# Patient Record
Sex: FEMALE | Race: WHITE | NOT HISPANIC OR LATINO | Employment: OTHER | URBAN - METROPOLITAN AREA
[De-identification: names, ages, dates, MRNs, and addresses within clinical notes are randomized per-mention and may not be internally consistent; named-entity substitution may affect disease eponyms.]

---

## 2017-06-09 ENCOUNTER — TELEPHONE (OUTPATIENT)
Dept: TRANSPLANT | Facility: CLINIC | Age: 72
End: 2017-06-09

## 2017-06-09 DIAGNOSIS — I27.9 CHRONIC PULMONARY HEART DISEASE: ICD-10-CM

## 2017-06-09 DIAGNOSIS — R06.82 TACHYPNEA: ICD-10-CM

## 2017-06-09 DIAGNOSIS — Z79.899 POLYPHARMACY: Primary | ICD-10-CM

## 2017-06-09 NOTE — TELEPHONE ENCOUNTER
----- Message from Kathleen Mack MA sent at 6/9/2017  3:52 PM CDT -----  Contact: pt       ----- Message -----  From: Gilda Bravonereida  Sent: 6/9/2017   3:27 PM  To: [facility] Heart Transplant Schedulers    PT is a new patient sent from a Cardiologist.  She needs to see a doctor w/ pulmonary hypertension.  She is referred from Dr. Poli carmen/ [facility].  She can be reached @ [phone].  Thanks!!

## 2017-07-06 ENCOUNTER — TELEPHONE (OUTPATIENT)
Dept: TRANSPLANT | Facility: CLINIC | Age: 72
End: 2017-07-06

## 2017-12-27 ENCOUNTER — TELEPHONE (OUTPATIENT)
Dept: TRANSPLANT | Facility: CLINIC | Age: 72
End: 2017-12-27

## 2017-12-27 NOTE — TELEPHONE ENCOUNTER
Called referring physician's office and spoke with nurse. According to last MD note from 12/11, patient is following with [facility] Pulmon[facility]. Nurse unsure if patient is following at [facility] for PH.  Attempted to contact patient with number listed and no answer, with no voicemail.  Will close referral at this time.

## 2021-05-26 ENCOUNTER — OFFICE VISIT (OUTPATIENT)
Dept: OPHTHALMOLOGY | Facility: CLINIC | Age: 76
End: 2021-05-26
Payer: MEDICARE

## 2021-05-26 DIAGNOSIS — H40.051 OCULAR HYPERTENSION OF RIGHT EYE: ICD-10-CM

## 2021-05-26 DIAGNOSIS — H35.371 EPIRETINAL MEMBRANE (ERM) OF RIGHT EYE: ICD-10-CM

## 2021-05-26 DIAGNOSIS — H31.002 CHORIORETINAL SCAR, LEFT: Primary | ICD-10-CM

## 2021-05-26 PROCEDURE — 92201 OPSCPY EXTND RTA DRAW UNI/BI: CPT | Mod: PBBFAC | Performed by: OPHTHALMOLOGY

## 2021-05-26 PROCEDURE — 92201 PR OPHTHALMOSCOPY, EXT, W/RET DRAW/SCLERAL DEPR, I&R, UNI/BI: ICD-10-PCS | Mod: S$PBB,,, | Performed by: OPHTHALMOLOGY

## 2021-05-26 PROCEDURE — 99202 OFFICE O/P NEW SF 15 MIN: CPT | Mod: PBBFAC | Performed by: OPHTHALMOLOGY

## 2021-05-26 PROCEDURE — 99999 PR PBB SHADOW E&M-NEW PATIENT-LVL II: ICD-10-PCS | Mod: PBBFAC,,, | Performed by: OPHTHALMOLOGY

## 2021-05-26 PROCEDURE — 92201 OPSCPY EXTND RTA DRAW UNI/BI: CPT | Mod: S$PBB,,, | Performed by: OPHTHALMOLOGY

## 2021-05-26 PROCEDURE — 92004 PR EYE EXAM, NEW PATIENT,COMPREHESV: ICD-10-PCS | Mod: S$PBB,,, | Performed by: OPHTHALMOLOGY

## 2021-05-26 PROCEDURE — 92134 POSTERIOR SEGMENT OCT RETINA (OCULAR COHERENCE TOMOGRAPHY)-BOTH EYES: ICD-10-PCS | Mod: 26,S$PBB,, | Performed by: OPHTHALMOLOGY

## 2021-05-26 PROCEDURE — 92004 COMPRE OPH EXAM NEW PT 1/>: CPT | Mod: S$PBB,,, | Performed by: OPHTHALMOLOGY

## 2021-05-26 PROCEDURE — 99999 PR PBB SHADOW E&M-NEW PATIENT-LVL II: CPT | Mod: PBBFAC,,, | Performed by: OPHTHALMOLOGY

## 2021-05-26 PROCEDURE — 92134 CPTRZ OPH DX IMG PST SGM RTA: CPT | Mod: PBBFAC | Performed by: OPHTHALMOLOGY

## 2021-05-26 RX ORDER — IBUPROFEN 100 MG/5ML
1000 SUSPENSION, ORAL (FINAL DOSE FORM) ORAL
COMMUNITY
End: 2022-03-08

## 2021-05-26 RX ORDER — INSULIN ASPART 100 [IU]/ML
INJECTION, SOLUTION INTRAVENOUS; SUBCUTANEOUS
COMMUNITY

## 2021-05-26 RX ORDER — ALCOHOL 2.38 KG/3.79L
1 GEL TOPICAL DAILY
COMMUNITY

## 2021-05-26 RX ORDER — SPIRONOLACTONE 25 MG
TABLET ORAL
COMMUNITY

## 2021-05-26 RX ORDER — GLUCOSAM/CHONDRO/HERB 149/HYAL 750-100 MG
TABLET ORAL
COMMUNITY
End: 2023-04-03

## 2021-05-26 RX ORDER — METOPROLOL TARTRATE 50 MG/1
50 TABLET ORAL
COMMUNITY
Start: 2021-03-22 | End: 2022-01-04

## 2021-05-26 RX ORDER — AMLODIPINE BESYLATE 5 MG/1
5 TABLET ORAL 2 TIMES DAILY
COMMUNITY
Start: 2021-04-29 | End: 2023-12-06

## 2021-07-26 ENCOUNTER — OFFICE VISIT (OUTPATIENT)
Dept: OPHTHALMOLOGY | Facility: CLINIC | Age: 76
End: 2021-07-26
Payer: MEDICARE

## 2021-07-26 DIAGNOSIS — E08.3293 DIABETES MELLITUS DUE TO UNDERLYING CONDITION WITH BOTH EYES AFFECTED BY MILD NONPROLIFERATIVE RETINOPATHY WITHOUT MACULAR EDEMA, WITH LONG-TERM CURRENT USE OF INSULIN: ICD-10-CM

## 2021-07-26 DIAGNOSIS — H35.371 EPIRETINAL MEMBRANE, RIGHT EYE: ICD-10-CM

## 2021-07-26 DIAGNOSIS — Z79.4 DIABETES MELLITUS DUE TO UNDERLYING CONDITION WITH BOTH EYES AFFECTED BY MILD NONPROLIFERATIVE RETINOPATHY WITHOUT MACULAR EDEMA, WITH LONG-TERM CURRENT USE OF INSULIN: ICD-10-CM

## 2021-07-26 DIAGNOSIS — H40.1194 PRIMARY OPEN-ANGLE GLAUCOMA, INDETERMINATE STAGE, UNSPECIFIED LATERALITY: Primary | ICD-10-CM

## 2021-07-26 DIAGNOSIS — Z98.890 HX OF DETACHED RETINA REPAIR: ICD-10-CM

## 2021-07-26 DIAGNOSIS — Z86.69 HX OF DETACHED RETINA REPAIR: ICD-10-CM

## 2021-07-26 DIAGNOSIS — H40.053 OCULAR HYPERTENSION, BILATERAL: Primary | ICD-10-CM

## 2021-07-26 DIAGNOSIS — Z96.1 PSEUDOPHAKIA OF BOTH EYES: ICD-10-CM

## 2021-07-26 DIAGNOSIS — H40.023 OPEN ANGLE WITH BORDERLINE FINDINGS AND HIGH GLAUCOMA RISK IN BOTH EYES: ICD-10-CM

## 2021-07-26 PROCEDURE — 76514 ECHO EXAM OF EYE THICKNESS: CPT | Mod: 26,S$PBB,, | Performed by: OPHTHALMOLOGY

## 2021-07-26 PROCEDURE — 92250 COLOR FUNDUS PHOTOGRAPHY - OU - BOTH EYES: ICD-10-PCS | Mod: 26,S$PBB,, | Performed by: OPHTHALMOLOGY

## 2021-07-26 PROCEDURE — 92020 PR SPECIAL EYE EVAL,GONIOSCOPY: ICD-10-PCS | Mod: S$PBB,,, | Performed by: OPHTHALMOLOGY

## 2021-07-26 PROCEDURE — 76514 ECHO EXAM OF EYE THICKNESS: CPT | Mod: PBBFAC | Performed by: OPHTHALMOLOGY

## 2021-07-26 PROCEDURE — 92083 HUMPHREY VISUAL FIELD - OU - BOTH EYES: ICD-10-PCS | Mod: 26,S$PBB,, | Performed by: OPHTHALMOLOGY

## 2021-07-26 PROCEDURE — 92250 FUNDUS PHOTOGRAPHY W/I&R: CPT | Mod: PBBFAC | Performed by: OPHTHALMOLOGY

## 2021-07-26 PROCEDURE — 99213 OFFICE O/P EST LOW 20 MIN: CPT | Mod: PBBFAC | Performed by: OPHTHALMOLOGY

## 2021-07-26 PROCEDURE — 99999 PR PBB SHADOW E&M-EST. PATIENT-LVL III: CPT | Mod: PBBFAC,,, | Performed by: OPHTHALMOLOGY

## 2021-07-26 PROCEDURE — 92083 EXTENDED VISUAL FIELD XM: CPT | Mod: PBBFAC | Performed by: OPHTHALMOLOGY

## 2021-07-26 PROCEDURE — 76514 PR  US, EYE, FOR CORNEAL THICKNESS: ICD-10-PCS | Mod: 26,S$PBB,, | Performed by: OPHTHALMOLOGY

## 2021-07-26 PROCEDURE — 92020 GONIOSCOPY: CPT | Mod: PBBFAC | Performed by: OPHTHALMOLOGY

## 2021-07-26 PROCEDURE — 99999 PR PBB SHADOW E&M-EST. PATIENT-LVL III: ICD-10-PCS | Mod: PBBFAC,,, | Performed by: OPHTHALMOLOGY

## 2021-07-26 PROCEDURE — 99214 OFFICE O/P EST MOD 30 MIN: CPT | Mod: S$PBB,,, | Performed by: OPHTHALMOLOGY

## 2021-07-26 PROCEDURE — 99214 PR OFFICE/OUTPT VISIT, EST, LEVL IV, 30-39 MIN: ICD-10-PCS | Mod: S$PBB,,, | Performed by: OPHTHALMOLOGY

## 2021-07-26 PROCEDURE — 92020 GONIOSCOPY: CPT | Mod: S$PBB,,, | Performed by: OPHTHALMOLOGY

## 2021-07-26 RX ORDER — INSULIN LISPRO 100 [IU]/ML
INJECTION, SOLUTION INTRAVENOUS; SUBCUTANEOUS
COMMUNITY
Start: 2021-06-03

## 2021-07-26 RX ORDER — LATANOPROST 50 UG/ML
1 SOLUTION/ DROPS OPHTHALMIC NIGHTLY
COMMUNITY
End: 2021-07-26 | Stop reason: SDUPTHER

## 2021-07-26 RX ORDER — CHLORHEXIDINE GLUCONATE ORAL RINSE 1.2 MG/ML
SOLUTION DENTAL
COMMUNITY
Start: 2021-06-28 | End: 2023-05-09 | Stop reason: CLARIF

## 2021-07-26 RX ORDER — HYDROCODONE BITARTRATE AND ACETAMINOPHEN 7.5; 325 MG/1; MG/1
1 TABLET ORAL EVERY 4 HOURS PRN
COMMUNITY
Start: 2021-06-28 | End: 2023-12-06

## 2021-07-26 RX ORDER — LATANOPROST 50 UG/ML
1 SOLUTION/ DROPS OPHTHALMIC NIGHTLY
Qty: 2.5 ML | Refills: 6 | Status: SHIPPED | OUTPATIENT
Start: 2021-07-26 | End: 2021-11-22 | Stop reason: SDUPTHER

## 2021-09-15 ENCOUNTER — TELEPHONE (OUTPATIENT)
Dept: OPHTHALMOLOGY | Facility: CLINIC | Age: 76
End: 2021-09-15

## 2021-10-05 ENCOUNTER — OFFICE VISIT (OUTPATIENT)
Dept: OPHTHALMOLOGY | Facility: CLINIC | Age: 76
End: 2021-10-05
Payer: MEDICARE

## 2021-10-05 DIAGNOSIS — Z96.1 PSEUDOPHAKIA OF BOTH EYES: ICD-10-CM

## 2021-10-05 DIAGNOSIS — Z86.69 HX OF DETACHED RETINA REPAIR: ICD-10-CM

## 2021-10-05 DIAGNOSIS — H40.023 OPEN ANGLE WITH BORDERLINE FINDINGS AND HIGH GLAUCOMA RISK IN BOTH EYES: Primary | ICD-10-CM

## 2021-10-05 DIAGNOSIS — H40.053 OCULAR HYPERTENSION, BILATERAL: ICD-10-CM

## 2021-10-05 DIAGNOSIS — H35.371 EPIRETINAL MEMBRANE, RIGHT EYE: ICD-10-CM

## 2021-10-05 DIAGNOSIS — Z98.890 HX OF DETACHED RETINA REPAIR: ICD-10-CM

## 2021-10-05 PROCEDURE — 92133 CPTRZD OPH DX IMG PST SGM ON: CPT | Mod: PBBFAC | Performed by: OPHTHALMOLOGY

## 2021-10-05 PROCEDURE — 99214 OFFICE O/P EST MOD 30 MIN: CPT | Mod: S$PBB,,, | Performed by: OPHTHALMOLOGY

## 2021-10-05 PROCEDURE — 99999 PR PBB SHADOW E&M-EST. PATIENT-LVL III: ICD-10-PCS | Mod: PBBFAC,,, | Performed by: OPHTHALMOLOGY

## 2021-10-05 PROCEDURE — 92133 POSTERIOR SEGMENT OCT OPTIC NERVE(OCULAR COHERENCE TOMOGRAPHY) - OU - BOTH EYES: ICD-10-PCS | Mod: 26,S$PBB,, | Performed by: OPHTHALMOLOGY

## 2021-10-05 PROCEDURE — 99214 PR OFFICE/OUTPT VISIT, EST, LEVL IV, 30-39 MIN: ICD-10-PCS | Mod: S$PBB,,, | Performed by: OPHTHALMOLOGY

## 2021-10-05 PROCEDURE — 99999 PR PBB SHADOW E&M-EST. PATIENT-LVL III: CPT | Mod: PBBFAC,,, | Performed by: OPHTHALMOLOGY

## 2021-10-05 PROCEDURE — 99213 OFFICE O/P EST LOW 20 MIN: CPT | Mod: PBBFAC | Performed by: OPHTHALMOLOGY

## 2021-10-05 RX ORDER — IBANDRONATE SODIUM 150 MG/1
TABLET, FILM COATED ORAL
COMMUNITY
End: 2023-05-09 | Stop reason: CLARIF

## 2021-10-05 RX ORDER — CLONIDINE HYDROCHLORIDE 0.1 MG/1
0.1 TABLET ORAL 3 TIMES DAILY
COMMUNITY
Start: 2021-07-27 | End: 2023-05-09 | Stop reason: CLARIF

## 2021-10-05 RX ORDER — AMLODIPINE AND VALSARTAN 5; 160 MG/1; MG/1
TABLET ORAL
COMMUNITY
End: 2023-05-09 | Stop reason: CLARIF

## 2021-10-05 RX ORDER — NAPROXEN SODIUM 220 MG/1
TABLET, FILM COATED ORAL
COMMUNITY
End: 2022-01-04

## 2021-11-22 DIAGNOSIS — H40.1194 PRIMARY OPEN-ANGLE GLAUCOMA, INDETERMINATE STAGE, UNSPECIFIED LATERALITY: ICD-10-CM

## 2021-11-22 RX ORDER — LATANOPROST 50 UG/ML
1 SOLUTION/ DROPS OPHTHALMIC NIGHTLY
Qty: 7.5 ML | Refills: 4 | Status: SHIPPED | OUTPATIENT
Start: 2021-11-22 | End: 2022-01-27 | Stop reason: SDUPTHER

## 2021-12-28 ENCOUNTER — TELEPHONE (OUTPATIENT)
Dept: OPHTHALMOLOGY | Facility: CLINIC | Age: 76
End: 2021-12-28
Payer: MEDICARE

## 2021-12-29 ENCOUNTER — OFFICE VISIT (OUTPATIENT)
Dept: OPHTHALMOLOGY | Facility: CLINIC | Age: 76
End: 2021-12-29
Payer: MEDICARE

## 2021-12-29 DIAGNOSIS — Z86.69 HX OF DETACHED RETINA REPAIR: ICD-10-CM

## 2021-12-29 DIAGNOSIS — H35.371 EPIRETINAL MEMBRANE, RIGHT EYE: ICD-10-CM

## 2021-12-29 DIAGNOSIS — E08.3293 DIABETES MELLITUS DUE TO UNDERLYING CONDITION WITH BOTH EYES AFFECTED BY MILD NONPROLIFERATIVE RETINOPATHY WITHOUT MACULAR EDEMA, WITH LONG-TERM CURRENT USE OF INSULIN: ICD-10-CM

## 2021-12-29 DIAGNOSIS — Z98.890 HX OF DETACHED RETINA REPAIR: ICD-10-CM

## 2021-12-29 DIAGNOSIS — Z79.4 DIABETES MELLITUS DUE TO UNDERLYING CONDITION WITH BOTH EYES AFFECTED BY MILD NONPROLIFERATIVE RETINOPATHY WITHOUT MACULAR EDEMA, WITH LONG-TERM CURRENT USE OF INSULIN: ICD-10-CM

## 2021-12-29 DIAGNOSIS — H40.51X2 NEOVASCULAR GLAUCOMA OF RIGHT EYE, MODERATE STAGE: Primary | ICD-10-CM

## 2021-12-29 PROCEDURE — 92285 EXTERNAL OCULAR PHOTOGRAPHY: CPT | Mod: PBBFAC | Performed by: OPHTHALMOLOGY

## 2021-12-29 PROCEDURE — 99999 PR PBB SHADOW E&M-EST. PATIENT-LVL III: ICD-10-PCS | Mod: PBBFAC,,, | Performed by: OPHTHALMOLOGY

## 2021-12-29 PROCEDURE — 99214 OFFICE O/P EST MOD 30 MIN: CPT | Mod: S$PBB,,, | Performed by: OPHTHALMOLOGY

## 2021-12-29 PROCEDURE — 99999 PR PBB SHADOW E&M-EST. PATIENT-LVL III: CPT | Mod: PBBFAC,,, | Performed by: OPHTHALMOLOGY

## 2021-12-29 PROCEDURE — 99213 OFFICE O/P EST LOW 20 MIN: CPT | Mod: PBBFAC,25 | Performed by: OPHTHALMOLOGY

## 2021-12-29 PROCEDURE — 99214 PR OFFICE/OUTPT VISIT, EST, LEVL IV, 30-39 MIN: ICD-10-PCS | Mod: S$PBB,,, | Performed by: OPHTHALMOLOGY

## 2021-12-29 PROCEDURE — 92020 GONIOSCOPY: CPT | Mod: S$PBB,,, | Performed by: OPHTHALMOLOGY

## 2021-12-29 PROCEDURE — 92235 FLUORESCEIN ANGIOGRAPHY - OU - BOTH EYES: ICD-10-PCS | Mod: 26,S$PBB,, | Performed by: OPHTHALMOLOGY

## 2021-12-29 PROCEDURE — 92235 FLUORESCEIN ANGRPH MLTIFRAME: CPT | Mod: 50,PBBFAC | Performed by: OPHTHALMOLOGY

## 2021-12-29 PROCEDURE — 92020 PR SPECIAL EYE EVAL,GONIOSCOPY: ICD-10-PCS | Mod: S$PBB,,, | Performed by: OPHTHALMOLOGY

## 2021-12-29 PROCEDURE — 92020 GONIOSCOPY: CPT | Mod: PBBFAC | Performed by: OPHTHALMOLOGY

## 2021-12-29 RX ORDER — FLUORESCEIN 500 MG/ML
5 INJECTION INTRAVENOUS
Status: COMPLETED | OUTPATIENT
Start: 2021-12-29 | End: 2021-12-29

## 2021-12-29 RX ADMIN — FLUORESCEIN 500 MG: 500 INJECTION INTRAVENOUS at 03:12

## 2021-12-29 RX ADMIN — BEVACIZUMAB 1.25 MG: 100 INJECTION, SOLUTION INTRAVENOUS at 04:12

## 2022-01-04 ENCOUNTER — OFFICE VISIT (OUTPATIENT)
Dept: OPHTHALMOLOGY | Facility: CLINIC | Age: 77
End: 2022-01-04
Payer: MEDICARE

## 2022-01-04 DIAGNOSIS — H40.51X2 NEOVASCULAR GLAUCOMA, RIGHT, MODERATE STAGE: ICD-10-CM

## 2022-01-04 DIAGNOSIS — H35.371 EPIRETINAL MEMBRANE, RIGHT EYE: Primary | ICD-10-CM

## 2022-01-04 DIAGNOSIS — H35.82 OCULAR ISCHEMIC SYNDROME: ICD-10-CM

## 2022-01-04 PROCEDURE — 99999 PR PBB SHADOW E&M-EST. PATIENT-LVL II: ICD-10-PCS | Mod: PBBFAC,,, | Performed by: OPHTHALMOLOGY

## 2022-01-04 PROCEDURE — 99212 OFFICE O/P EST SF 10 MIN: CPT | Mod: PBBFAC | Performed by: OPHTHALMOLOGY

## 2022-01-04 PROCEDURE — 99999 PR PBB SHADOW E&M-EST. PATIENT-LVL II: CPT | Mod: PBBFAC,,, | Performed by: OPHTHALMOLOGY

## 2022-01-04 PROCEDURE — 67228 TREATMENT X10SV RETINOPATHY: CPT | Mod: S$PBB,RT,, | Performed by: OPHTHALMOLOGY

## 2022-01-04 PROCEDURE — 67228 TREATMENT X10SV RETINOPATHY: CPT | Mod: PBBFAC,RT | Performed by: OPHTHALMOLOGY

## 2022-01-04 PROCEDURE — 92014 PR EYE EXAM, EST PATIENT,COMPREHESV: ICD-10-PCS | Mod: 57,S$PBB,, | Performed by: OPHTHALMOLOGY

## 2022-01-04 PROCEDURE — 67228 PR TREATMENT EXTENSIVE RETINOPATHY, PHOTOCOAGULATION: ICD-10-PCS | Mod: S$PBB,RT,, | Performed by: OPHTHALMOLOGY

## 2022-01-04 PROCEDURE — 92014 COMPRE OPH EXAM EST PT 1/>: CPT | Mod: 57,S$PBB,, | Performed by: OPHTHALMOLOGY

## 2022-01-04 RX ORDER — IBUPROFEN 100 MG/5ML
1000 SUSPENSION, ORAL (FINAL DOSE FORM) ORAL DAILY
COMMUNITY

## 2022-01-04 RX ORDER — LUTEIN 6 MG
6 TABLET ORAL DAILY
COMMUNITY
End: 2022-03-08

## 2022-01-04 NOTE — PROGRESS NOTES
HPI      DLS 12/29/2021 by Dr. AGUSTIN Prater MD           05/26/2021 by Dr. Angie MD    77 YO F w/ h/o A-fib and Tachycardia--reports her last episode occurred in   March.        Pt reports vision is blurry OD (appearance of a FOG) w/o flashes/and   floaters.     CC: pt is here  today for possible PRP Laser due to elevated IOP of 43 w/   Dr. Prater for the TX of NVO OD    -pain  ++blurred Va ( cloud in vision )   -flashes/floaters  ++fog over OD )onset x 3 wks ago)    Eye meds: Latanaprost OD qhs                    Alphagan OD bid                    Atropine OD qhs    A/P    1. 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 Altamirano    IOP improved today    Start PRP OD today  Continue gtts    2. RD OS  S/p buckle/cryo 1980 with Dr. Payton    3. PCIOL OU      BS/BP/chol control      2 weeks complete PRP OD       Risks, benefits, and alternatives to treatment discussed in detail with the patient.  The patient voiced understanding and wished to proceed with the procedure    Laser Procedure Note  Dx: NVG from OIS OD  Laser: PRP OD  Argon  Spot: 200  Power: 150-300  Dur: 0.1  #:   [id number]  Complications: None  F/U as above

## 2022-01-27 ENCOUNTER — OFFICE VISIT (OUTPATIENT)
Dept: OPHTHALMOLOGY | Facility: CLINIC | Age: 77
End: 2022-01-27
Payer: MEDICARE

## 2022-01-27 DIAGNOSIS — H40.51X2 NEOVASCULAR GLAUCOMA, RIGHT, MODERATE STAGE: Primary | ICD-10-CM

## 2022-01-27 DIAGNOSIS — H40.1194 PRIMARY OPEN-ANGLE GLAUCOMA, INDETERMINATE STAGE, UNSPECIFIED LATERALITY: ICD-10-CM

## 2022-01-27 DIAGNOSIS — H35.82 OCULAR ISCHEMIC SYNDROME: ICD-10-CM

## 2022-01-27 PROCEDURE — 99999 PR PBB SHADOW E&M-EST. PATIENT-LVL III: CPT | Mod: PBBFAC,,, | Performed by: OPHTHALMOLOGY

## 2022-01-27 PROCEDURE — 99213 OFFICE O/P EST LOW 20 MIN: CPT | Mod: PBBFAC | Performed by: OPHTHALMOLOGY

## 2022-01-27 PROCEDURE — 99499 NO LOS: ICD-10-PCS | Mod: S$PBB,,, | Performed by: OPHTHALMOLOGY

## 2022-01-27 PROCEDURE — 67228 TREATMENT X10SV RETINOPATHY: CPT | Mod: S$PBB,RT,, | Performed by: OPHTHALMOLOGY

## 2022-01-27 PROCEDURE — 67228 PR TREATMENT EXTENSIVE RETINOPATHY, PHOTOCOAGULATION: ICD-10-PCS | Mod: S$PBB,RT,, | Performed by: OPHTHALMOLOGY

## 2022-01-27 PROCEDURE — 67228 TREATMENT X10SV RETINOPATHY: CPT | Mod: PBBFAC,RT | Performed by: OPHTHALMOLOGY

## 2022-01-27 PROCEDURE — 99999 PR PBB SHADOW E&M-EST. PATIENT-LVL III: ICD-10-PCS | Mod: PBBFAC,,, | Performed by: OPHTHALMOLOGY

## 2022-01-27 PROCEDURE — 99499 UNLISTED E&M SERVICE: CPT | Mod: S$PBB,,, | Performed by: OPHTHALMOLOGY

## 2022-01-27 RX ORDER — BRIMONIDINE TARTRATE 1.5 MG/ML
1 SOLUTION/ DROPS OPHTHALMIC 2 TIMES DAILY
COMMUNITY
End: 2022-01-27 | Stop reason: SDUPTHER

## 2022-01-27 NOTE — PROGRESS NOTES
HPI      DLS 12/29/2021 by Dr. AGUSTIN Prater MD           05/26/2021 by Dr. CHEY Rico MD    Pt here for PRP OD.  Pt states she feels some slight improvement VA OD.    Pt denies eye pain, flashes or floaters.     Latanaprost OD qhs  Alphagan OD bid  POHx:   Ocular hypertension, bilateral          A/P    1. 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 Altamirano    IOP improved today    Complete PRP OD today  Continue gtts    2. RD OS  S/p buckle/cryo 1980 with Dr. Payton    3. PCIOL OU      BS/BP/chol control      4 weeks oct and dilate       Risks, benefits, and alternatives to treatment discussed in detail with the patient.  The patient voiced understanding and wished to proceed with the procedure    Laser Procedure Note  Dx: NVG from OIS OD  Laser: PRP OD  Argon  Spot: 200  Power: 150-300  Dur: 0.1  #:   [id number]  Complications: None  F/U as above

## 2022-01-28 RX ORDER — BRIMONIDINE TARTRATE 1.5 MG/ML
1 SOLUTION/ DROPS OPHTHALMIC 2 TIMES DAILY
Qty: 10 ML | Refills: 4 | Status: SHIPPED | OUTPATIENT
Start: 2022-01-28 | End: 2022-03-08

## 2022-01-28 RX ORDER — LATANOPROST 50 UG/ML
1 SOLUTION/ DROPS OPHTHALMIC NIGHTLY
Qty: 7.5 ML | Refills: 4 | Status: SHIPPED | OUTPATIENT
Start: 2022-01-28

## 2022-02-07 ENCOUNTER — OFFICE VISIT (OUTPATIENT)
Dept: OPHTHALMOLOGY | Facility: CLINIC | Age: 77
End: 2022-02-07
Payer: MEDICARE

## 2022-02-07 DIAGNOSIS — H40.51X2 NEOVASCULAR GLAUCOMA, RIGHT, MODERATE STAGE: Primary | ICD-10-CM

## 2022-02-07 DIAGNOSIS — Z86.69 HX OF DETACHED RETINA REPAIR: ICD-10-CM

## 2022-02-07 DIAGNOSIS — H40.053 OCULAR HYPERTENSION, BILATERAL: ICD-10-CM

## 2022-02-07 DIAGNOSIS — Z96.1 PSEUDOPHAKIA OF BOTH EYES: ICD-10-CM

## 2022-02-07 DIAGNOSIS — Z98.890 HX OF DETACHED RETINA REPAIR: ICD-10-CM

## 2022-02-07 DIAGNOSIS — H35.82 OCULAR ISCHEMIC SYNDROME: ICD-10-CM

## 2022-02-07 PROCEDURE — 92020 GONIOSCOPY: CPT | Mod: PBBFAC | Performed by: OPHTHALMOLOGY

## 2022-02-07 PROCEDURE — 99999 PR PBB SHADOW E&M-EST. PATIENT-LVL III: CPT | Mod: PBBFAC,,, | Performed by: OPHTHALMOLOGY

## 2022-02-07 PROCEDURE — 99214 OFFICE O/P EST MOD 30 MIN: CPT | Mod: S$PBB,,, | Performed by: OPHTHALMOLOGY

## 2022-02-07 PROCEDURE — 99999 PR PBB SHADOW E&M-EST. PATIENT-LVL III: ICD-10-PCS | Mod: PBBFAC,,, | Performed by: OPHTHALMOLOGY

## 2022-02-07 PROCEDURE — 99214 PR OFFICE/OUTPT VISIT, EST, LEVL IV, 30-39 MIN: ICD-10-PCS | Mod: S$PBB,,, | Performed by: OPHTHALMOLOGY

## 2022-02-07 PROCEDURE — 92020 PR SPECIAL EYE EVAL,GONIOSCOPY: ICD-10-PCS | Mod: S$PBB,,, | Performed by: OPHTHALMOLOGY

## 2022-02-07 PROCEDURE — 92020 GONIOSCOPY: CPT | Mod: S$PBB,,, | Performed by: OPHTHALMOLOGY

## 2022-02-07 PROCEDURE — 99213 OFFICE O/P EST LOW 20 MIN: CPT | Mod: PBBFAC | Performed by: OPHTHALMOLOGY

## 2022-02-07 RX ORDER — DORZOLAMIDE HCL 20 MG/ML
1 SOLUTION/ DROPS OPHTHALMIC 2 TIMES DAILY
COMMUNITY
End: 2022-02-07 | Stop reason: SDUPTHER

## 2022-02-07 RX ORDER — BRIMONIDINE TARTRATE 2 MG/ML
1 SOLUTION/ DROPS OPHTHALMIC 2 TIMES DAILY
COMMUNITY
End: 2022-02-07 | Stop reason: SDUPTHER

## 2022-02-07 RX ORDER — DORZOLAMIDE HCL 20 MG/ML
1 SOLUTION/ DROPS OPHTHALMIC 2 TIMES DAILY
Qty: 10 ML | Refills: 6 | Status: SHIPPED | OUTPATIENT
Start: 2022-02-07 | End: 2023-01-09 | Stop reason: SDUPTHER

## 2022-02-07 RX ORDER — BRIMONIDINE TARTRATE 2 MG/ML
1 SOLUTION/ DROPS OPHTHALMIC 2 TIMES DAILY
Qty: 10 ML | Refills: 6 | Status: SHIPPED | OUTPATIENT
Start: 2022-02-07 | End: 2023-01-09 | Stop reason: SDUPTHER

## 2022-02-07 NOTE — PROGRESS NOTES
HPI     DLS: 01/27/2022-Raulzulla   12/29/2021-Moi  Ocular hypertension, bilateral   Diabetes mellitus due to underlying condition with both eyes a ffected by   mild nonproliferative retinopathy without macular edema, with long-term   current use of insulin   Pseudophakia of both eyes   Hx of detached retina repair   Epiretinal membrane, right eye   S/p ERM peel in 2013 OD.   PRP OD 01/27/2022    Open angle with borderline find ings and high glaucoma risk in both eyes     Xalatan QHS OU  Brimonidine BID OU       Last edited by Di Soto MA on 2/7/2022  1:25 PM. (History)            Assessment /Plan     For exam results, see Encounter Report.    Neovascular glaucoma, right, moderate stage    Hx of detached retina repair    Pseudophakia of both eyes    Ocular ischemic syndrome        A-Fib    Presents 12/29/2021 --> 43 // 18  + NVG // + NVA OD  + Iritis   + IVFA delayed fill with NVE  ==> PDR vs OIS OD  ==> discussed with patient concerns & options  ==> Proceed with IO IRIS & tap OD  ==> Sees outside cardiology for A-Fib --> recommend Carotid US as discussed      POAG High Righ risk OD > OS  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    Pre-Tx 34 // 20  07/26/2021    CCT  543 // 565    <24 --> Not achieved --> add Dorz --> discussed Xen // Ahmed options    Right eye --> adjust  Xal q day  Alphagan BID  Dorz BID --> start and discussed SE, use and expectations with Q & A  PF 1% BID --> off  A 1% q day --> off  On BB --> Does not tolerate well      H/o Pseudophakia OU  OD with Dr. Castle in 2007   OS with Dr. Huynh in 2014 --> Sulcus 3 piece  Open Cap OU      H/o RD OS  SP  SB/repair in 1980 --> Dr Payton    SP ERM Peel // PPVx OD      NIDDM   Right eye + PDR c NVG  12/29/2021  - poorly controlled A1c for many years. ~9  Very difficult control 2/2 to gastroparesis  Diagnosed at age 36  + IO IRIS c AC Tap per retina service    RD precautions:  Discussed symptoms of RD with increased flashes, floaters, decreasing vision.   Patient/Family to call and return immediately to clinic should the symptoms of RD occur. Voiced good understanding with Q & A.      Right eye NVA  12/29/2021        Plan:  RTC 1 month IOP & adherence --> Retina service  RTC sooner prn with good understanding

## 2022-03-08 ENCOUNTER — OFFICE VISIT (OUTPATIENT)
Dept: OPHTHALMOLOGY | Facility: CLINIC | Age: 77
End: 2022-03-08
Payer: MEDICARE

## 2022-03-08 DIAGNOSIS — H40.51X2 NEOVASCULAR GLAUCOMA, RIGHT, MODERATE STAGE: Primary | ICD-10-CM

## 2022-03-08 DIAGNOSIS — H35.371 EPIRETINAL MEMBRANE, RIGHT EYE: ICD-10-CM

## 2022-03-08 DIAGNOSIS — H35.82 OCULAR ISCHEMIC SYNDROME: ICD-10-CM

## 2022-03-08 DIAGNOSIS — H33.022 RETINAL DETACHMENT OF LEFT EYE WITH MULTIPLE BREAKS: ICD-10-CM

## 2022-03-08 PROCEDURE — 92201 OPSCPY EXTND RTA DRAW UNI/BI: CPT | Mod: PBBFAC | Performed by: OPHTHALMOLOGY

## 2022-03-08 PROCEDURE — 99213 OFFICE O/P EST LOW 20 MIN: CPT | Mod: PBBFAC | Performed by: OPHTHALMOLOGY

## 2022-03-08 PROCEDURE — 92014 PR EYE EXAM, EST PATIENT,COMPREHESV: ICD-10-PCS | Mod: S$PBB,,, | Performed by: OPHTHALMOLOGY

## 2022-03-08 PROCEDURE — 92201 PR OPHTHALMOSCOPY, EXT, W/RET DRAW/SCLERAL DEPR, I&R, UNI/BI: ICD-10-PCS | Mod: S$PBB,,, | Performed by: OPHTHALMOLOGY

## 2022-03-08 PROCEDURE — 99999 PR PBB SHADOW E&M-EST. PATIENT-LVL III: ICD-10-PCS | Mod: PBBFAC,,, | Performed by: OPHTHALMOLOGY

## 2022-03-08 PROCEDURE — 92134 POSTERIOR SEGMENT OCT RETINA (OCULAR COHERENCE TOMOGRAPHY)-BOTH EYES: ICD-10-PCS | Mod: 26,S$PBB,, | Performed by: OPHTHALMOLOGY

## 2022-03-08 PROCEDURE — 92201 OPSCPY EXTND RTA DRAW UNI/BI: CPT | Mod: S$PBB,,, | Performed by: OPHTHALMOLOGY

## 2022-03-08 PROCEDURE — 92134 CPTRZ OPH DX IMG PST SGM RTA: CPT | Mod: PBBFAC | Performed by: OPHTHALMOLOGY

## 2022-03-08 PROCEDURE — 99999 PR PBB SHADOW E&M-EST. PATIENT-LVL III: CPT | Mod: PBBFAC,,, | Performed by: OPHTHALMOLOGY

## 2022-03-08 PROCEDURE — 92014 COMPRE OPH EXAM EST PT 1/>: CPT | Mod: S$PBB,,, | Performed by: OPHTHALMOLOGY

## 2022-03-08 RX ORDER — FAMOTIDINE 20 MG/1
20 TABLET, FILM COATED ORAL DAILY
COMMUNITY
Start: 2022-01-11 | End: 2023-12-06

## 2022-03-08 RX ORDER — BLOOD SUGAR DIAGNOSTIC
STRIP MISCELLANEOUS 3 TIMES DAILY
COMMUNITY
Start: 2022-02-03 | End: 2023-12-06

## 2022-03-08 RX ORDER — HYDROGEN PEROXIDE 3 %
20 SOLUTION, NON-ORAL MISCELLANEOUS
COMMUNITY
End: 2023-05-09 | Stop reason: CLARIF

## 2022-03-08 RX ORDER — DORZOLAMIDE HYDROCHLORIDE AND TIMOLOL MALEATE 20; 5 MG/ML; MG/ML
1 SOLUTION/ DROPS OPHTHALMIC
COMMUNITY
End: 2023-12-06

## 2022-03-08 NOTE — PROGRESS NOTES
"HPI     4 wk fu OCT/DFE post PRP OD    Pt unsure of how her va is today. Has improved "foggy" va but fluctuates   from day to day per pt. Pt states her eye feel tired. Pt has not had any   headaches, pain OD since last visit.    No flashes  No floater  No pain today  Gtts: Alphagan BID OD          Trusopt BID OD           Latanoprost QHS OD    Last edited by Juliann Leyva on 3/8/2022 10:47 AM. (History)        OCT - small subfoveal fluid collection  OS no ME       A/P    1. 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 Altamirano  S/p PRP OD    IOP improved today    3/22 small subfoveal fluid collection post PRP - ASx  Monitor for improvement, can consider Avastin if NI next visit    2. RD OS  S/p buckle/cryo 1980 with Dr. Payton    3. PCIOL OU    4. ERM OD  S/p PPV with Amina      BS/BP/chol control      8 weeks oct no dilate     "

## 2022-04-06 ENCOUNTER — TELEPHONE (OUTPATIENT)
Dept: OPHTHALMOLOGY | Facility: CLINIC | Age: 77
End: 2022-04-06
Payer: MEDICARE

## 2022-04-06 NOTE — TELEPHONE ENCOUNTER
----- Message from Kenzie Biswas sent at 4/6/2022 11:41 AM CDT -----  Regarding: Appt  Contact: Jayda Werner is calling to see if need to be seen again.    [phone]

## 2022-05-10 ENCOUNTER — OFFICE VISIT (OUTPATIENT)
Dept: OPHTHALMOLOGY | Facility: CLINIC | Age: 77
End: 2022-05-10
Payer: MEDICARE

## 2022-05-10 DIAGNOSIS — H40.053 OCULAR HYPERTENSION, BILATERAL: ICD-10-CM

## 2022-05-10 DIAGNOSIS — H35.371 EPIRETINAL MEMBRANE, RIGHT EYE: ICD-10-CM

## 2022-05-10 DIAGNOSIS — H33.022 RETINAL DETACHMENT OF LEFT EYE WITH MULTIPLE BREAKS: Primary | ICD-10-CM

## 2022-05-10 DIAGNOSIS — H40.51X2 NEOVASCULAR GLAUCOMA, RIGHT, MODERATE STAGE: ICD-10-CM

## 2022-05-10 DIAGNOSIS — H35.82 OCULAR ISCHEMIC SYNDROME: ICD-10-CM

## 2022-05-10 PROCEDURE — 92014 PR EYE EXAM, EST PATIENT,COMPREHESV: ICD-10-PCS | Mod: S$PBB,,, | Performed by: OPHTHALMOLOGY

## 2022-05-10 PROCEDURE — 99999 PR PBB SHADOW E&M-EST. PATIENT-LVL IV: CPT | Mod: PBBFAC,,, | Performed by: OPHTHALMOLOGY

## 2022-05-10 PROCEDURE — 92201 PR OPHTHALMOSCOPY, EXT, W/RET DRAW/SCLERAL DEPR, I&R, UNI/BI: ICD-10-PCS | Mod: S$PBB,,, | Performed by: OPHTHALMOLOGY

## 2022-05-10 PROCEDURE — 92014 COMPRE OPH EXAM EST PT 1/>: CPT | Mod: S$PBB,,, | Performed by: OPHTHALMOLOGY

## 2022-05-10 PROCEDURE — 92201 OPSCPY EXTND RTA DRAW UNI/BI: CPT | Mod: PBBFAC | Performed by: OPHTHALMOLOGY

## 2022-05-10 PROCEDURE — 99214 OFFICE O/P EST MOD 30 MIN: CPT | Mod: PBBFAC | Performed by: OPHTHALMOLOGY

## 2022-05-10 PROCEDURE — 99999 PR PBB SHADOW E&M-EST. PATIENT-LVL IV: ICD-10-PCS | Mod: PBBFAC,,, | Performed by: OPHTHALMOLOGY

## 2022-05-10 PROCEDURE — 92134 POSTERIOR SEGMENT OCT RETINA (OCULAR COHERENCE TOMOGRAPHY)-BOTH EYES: ICD-10-PCS | Mod: 26,S$PBB,, | Performed by: OPHTHALMOLOGY

## 2022-05-10 PROCEDURE — 92134 CPTRZ OPH DX IMG PST SGM RTA: CPT | Mod: PBBFAC | Performed by: OPHTHALMOLOGY

## 2022-05-10 PROCEDURE — 92201 OPSCPY EXTND RTA DRAW UNI/BI: CPT | Mod: S$PBB,,, | Performed by: OPHTHALMOLOGY

## 2022-05-10 NOTE — PROGRESS NOTES
HPI     Patient here today for 8 week f/u. C/o blurry VA and tired eyes. No pain.   No f/f.    Brimonidine bid OD  Dorzolamide bid OD  Latanoprost qhs OD    Last edited by Brinda Frausto on 5/10/2022 11:09 AM. (History)               OCT - small subfoveal fluid collection - improving  OS no ME       A/P    1. 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 Altamirano  S/p PRP OD    IOP improved today    3/22 small subfoveal fluid collection post PRP - ASx  5/22 - fluid improving without tx  obs today    2. RD OS  S/p buckle/cryo 1980 with Dr. Payton    3. PCIOL OU    4. ERM OD  S/p PPV with Huynh      BS/BP/chol control      4 months OCT and dilate

## 2022-09-13 ENCOUNTER — OFFICE VISIT (OUTPATIENT)
Dept: OPHTHALMOLOGY | Facility: CLINIC | Age: 77
End: 2022-09-13
Payer: MEDICARE

## 2022-09-13 DIAGNOSIS — H40.51X2 NEOVASCULAR GLAUCOMA, RIGHT, MODERATE STAGE: ICD-10-CM

## 2022-09-13 DIAGNOSIS — H33.022 RETINAL DETACHMENT OF LEFT EYE WITH MULTIPLE BREAKS: Primary | ICD-10-CM

## 2022-09-13 DIAGNOSIS — H26.492 POSTERIOR CAPSULAR OPACIFICATION, LEFT: ICD-10-CM

## 2022-09-13 DIAGNOSIS — H35.371 EPIRETINAL MEMBRANE, RIGHT EYE: ICD-10-CM

## 2022-09-13 DIAGNOSIS — H35.82 OCULAR ISCHEMIC SYNDROME: ICD-10-CM

## 2022-09-13 PROCEDURE — 92201 PR OPHTHALMOSCOPY, EXT, W/RET DRAW/SCLERAL DEPR, I&R, UNI/BI: ICD-10-PCS | Mod: S$PBB,,, | Performed by: OPHTHALMOLOGY

## 2022-09-13 PROCEDURE — 92014 COMPRE OPH EXAM EST PT 1/>: CPT | Mod: S$PBB,,, | Performed by: OPHTHALMOLOGY

## 2022-09-13 PROCEDURE — 99213 OFFICE O/P EST LOW 20 MIN: CPT | Mod: PBBFAC | Performed by: OPHTHALMOLOGY

## 2022-09-13 PROCEDURE — 92134 POSTERIOR SEGMENT OCT RETINA (OCULAR COHERENCE TOMOGRAPHY)-BOTH EYES: ICD-10-PCS | Mod: 26,S$PBB,, | Performed by: OPHTHALMOLOGY

## 2022-09-13 PROCEDURE — 92014 PR EYE EXAM, EST PATIENT,COMPREHESV: ICD-10-PCS | Mod: S$PBB,,, | Performed by: OPHTHALMOLOGY

## 2022-09-13 PROCEDURE — 99999 PR PBB SHADOW E&M-EST. PATIENT-LVL III: CPT | Mod: PBBFAC,,, | Performed by: OPHTHALMOLOGY

## 2022-09-13 PROCEDURE — 92201 OPSCPY EXTND RTA DRAW UNI/BI: CPT | Mod: S$PBB,,, | Performed by: OPHTHALMOLOGY

## 2022-09-13 PROCEDURE — 92201 OPSCPY EXTND RTA DRAW UNI/BI: CPT | Mod: PBBFAC | Performed by: OPHTHALMOLOGY

## 2022-09-13 PROCEDURE — 92134 CPTRZ OPH DX IMG PST SGM RTA: CPT | Mod: PBBFAC | Performed by: OPHTHALMOLOGY

## 2022-09-13 PROCEDURE — 99999 PR PBB SHADOW E&M-EST. PATIENT-LVL III: ICD-10-PCS | Mod: PBBFAC,,, | Performed by: OPHTHALMOLOGY

## 2022-09-13 NOTE — PROGRESS NOTES
HPI    Patient here today for 4 mo f/u. C/o worsening VA ou, no pain or f/f.    Brimonidine bid OD   Dorzolamide bid OD   Latanoprost qhs OD    Last edited by Brinda Frausto on 9/13/2022 10:33 AM.             OCT - small subfoveal fluid collection - improving  OS no ME       A/P    1. 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 Altamirano  S/p PRP OD    IOP improved today    3/22 small subfoveal fluid collection post PRP - ASx  5/22 - fluid improving without tx  obs today    2. RD OS  S/p buckle/cryo 1980 with Dr. Payton    3. PCIOL OU  Central PCO encroachment OS  To Dr. Prater for YAG OS followed by MRx update - may be low vision      4. ERM OD  S/p PPV with Huynh      BS/BP/chol control      12 months OCT and dilate

## 2022-10-11 ENCOUNTER — OFFICE VISIT (OUTPATIENT)
Dept: OPHTHALMOLOGY | Facility: CLINIC | Age: 77
End: 2022-10-11
Payer: MEDICARE

## 2022-10-11 DIAGNOSIS — Z96.1 PSEUDOPHAKIA OF BOTH EYES: ICD-10-CM

## 2022-10-11 DIAGNOSIS — H40.51X2 NEOVASCULAR GLAUCOMA, RIGHT, MODERATE STAGE: ICD-10-CM

## 2022-10-11 DIAGNOSIS — H26.492 POSTERIOR CAPSULAR OPACIFICATION, LEFT: Primary | ICD-10-CM

## 2022-10-11 DIAGNOSIS — H26.492 POSTERIOR CAPSULAR OPACIFICATION OF LEFT EYE, OBSCURING VISION: Primary | ICD-10-CM

## 2022-10-11 DIAGNOSIS — Z79.4 DIABETES MELLITUS DUE TO UNDERLYING CONDITION WITH BOTH EYES AFFECTED BY MILD NONPROLIFERATIVE RETINOPATHY WITHOUT MACULAR EDEMA, WITH LONG-TERM CURRENT USE OF INSULIN: ICD-10-CM

## 2022-10-11 DIAGNOSIS — H35.82 OCULAR ISCHEMIC SYNDROME: ICD-10-CM

## 2022-10-11 DIAGNOSIS — E08.3293 DIABETES MELLITUS DUE TO UNDERLYING CONDITION WITH BOTH EYES AFFECTED BY MILD NONPROLIFERATIVE RETINOPATHY WITHOUT MACULAR EDEMA, WITH LONG-TERM CURRENT USE OF INSULIN: ICD-10-CM

## 2022-10-11 PROCEDURE — 66821 AFTER CATARACT LASER SURGERY: CPT | Mod: PBBFAC,LT | Performed by: OPHTHALMOLOGY

## 2022-10-11 PROCEDURE — 99999 PR PBB SHADOW E&M-EST. PATIENT-LVL II: CPT | Mod: PBBFAC,,, | Performed by: OPHTHALMOLOGY

## 2022-10-11 PROCEDURE — 99212 OFFICE O/P EST SF 10 MIN: CPT | Mod: PBBFAC,25 | Performed by: OPHTHALMOLOGY

## 2022-10-11 PROCEDURE — 99214 OFFICE O/P EST MOD 30 MIN: CPT | Mod: 57,S$PBB,, | Performed by: OPHTHALMOLOGY

## 2022-10-11 PROCEDURE — 66821 YAG CAPSULOTOMY - OS - LEFT EYE: ICD-10-PCS | Mod: S$PBB,LT,, | Performed by: OPHTHALMOLOGY

## 2022-10-11 PROCEDURE — 99999 PR PBB SHADOW E&M-EST. PATIENT-LVL II: ICD-10-PCS | Mod: PBBFAC,,, | Performed by: OPHTHALMOLOGY

## 2022-10-11 PROCEDURE — 99214 PR OFFICE/OUTPT VISIT, EST, LEVL IV, 30-39 MIN: ICD-10-PCS | Mod: 57,S$PBB,, | Performed by: OPHTHALMOLOGY

## 2022-10-11 RX ORDER — PREDNISOLONE ACETATE 10 MG/ML
1 SUSPENSION/ DROPS OPHTHALMIC 4 TIMES DAILY
Qty: 5 ML | Refills: 0 | Status: SHIPPED | OUTPATIENT
Start: 2022-10-11 | End: 2022-10-15

## 2022-10-11 NOTE — PROGRESS NOTES
HPI    Patient here for YAG CAP EVAL OS     Pt has been having some blurred VA OS lately  Pt mentions she has had YAG laser done OS years ago    Brimonidine bid OD   Dorzolamide bid OD   Latanoprost qhs OD    Last edited by Marielena Hopper on 10/11/2022  9:07 AM.            Assessment /Plan     For exam results, see Encounter Report.    Posterior capsular opacification of left eye, obscuring vision  -     Yag Capsulotomy - OS - Left Eye    Ocular ischemic syndrome    Neovascular glaucoma, right, moderate stage    Pseudophakia of both eyes    Diabetes mellitus due to underlying condition with both eyes affected by mild nonproliferative retinopathy without macular edema, with long-term current use of insulin      A-Fib    Presents 12/29/2021 --> 43 // 18  + NVG // + NVA OD  + Iritis   + IVFA delayed fill with NVE  ==> OIS OD  ==> discussed with patient concerns & options  ==> Proceed with IO IRIS & tap OD  ==> Sees outside cardiology for A-Fib      POAG High Righ risk OD > OS  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    Pre-Tx 34 // 20  07/26/2021    CCT  543 // 565    <24 --> Not achieved --> discussed options    Right eye --> CSM -> tolerating well  Xal q day  Alphagan BID  Dorz BID   PF 1% BID --> off  A 1% q day --> off  On BB --> Does not tolerate well      H/o Pseudophakia OU  OD with Dr. Castle in 2007   OS with Dr. Huynh in 2014 --> Sulcus 3 piece  Open Cap OU --> Partial VS PCO OS through visual axis --> discussed options      H/o RD OS  SP  SB/repair in 1980 --> Dr Payton    SP ERM Peel // PPVx OD      NIDDM   Right eye + PDR c NVG  12/29/2021  - poorly controlled A1c for many years. ~9  Very difficult control 2/2 to gastroparesis  Diagnosed at age 36  + IO IRIS c AC Tap per retina service    RD precautions:  Discussed symptoms of RD with increased flashes, floaters, decreasing vision.  Patient/Family to call and return immediately to clinic should the symptoms of RD occur. Voiced good understanding with Q & A.      Right eye  NVA  12/29/2021        Laser Capsulotomy  left eye    Laser Capsulotomy Surgery Consent:  Patient with visually significant capsular opacification negatively impacting visually based ADLs / Reading and TV / glare and QOL.  Discussed with patient options, risks and benefits, expectations of laser surgery, utilized an eye model with questions and answers to facilitate discussion.  We specifically covered the risks of IOP spike, bleeding, lens disruption, persistent visual disturbance,macular edema, retinal detachment,  iritis & pain,  and the need for further surgery.  The patient  voiced good understanding and patient wishes to proceed with surgery.  The patient will likely benefit from laser surgery and patient signed consent for Left Eye.      The correct eye was identified by patient prior to start of the procedure.    YAG Capsulotomy:    Total Energy:  74.4 mJ    Total # of Exposures: 31 Burst    Patient tolerated procedure well       Jayda understands the information Dr. Prater provided at the time of visit.  The patient voices good understanding of these these instructions and agrees with the plan.  Retinal detachment precautions were discussed and patient is to return for increasing flashes, floaters and decreasing vision.  In addition, patient will return to clinic sooner as needed for pain, decreasing vision etc.    PF 1% 4x/day for 4-5 Days in the eye with laser treatment      RD precautions:  Re-Discussed with patient symptoms of RD with increased flashes, floaters, decreasing vision.  Patient/Family to call and return immediately to clinic should the symptoms of RD occur.  patient voice good understanding.       Plan:  RTC Retina service --> p YAG Cap OS --> DFE  RTC sooner prn with good understanding

## 2022-11-08 ENCOUNTER — OFFICE VISIT (OUTPATIENT)
Dept: OPHTHALMOLOGY | Facility: CLINIC | Age: 77
End: 2022-11-08
Payer: MEDICARE

## 2022-11-08 DIAGNOSIS — Z96.1 PSEUDOPHAKIA OF BOTH EYES: ICD-10-CM

## 2022-11-08 DIAGNOSIS — Z79.4 DIABETES MELLITUS DUE TO UNDERLYING CONDITION WITH BOTH EYES AFFECTED BY MILD NONPROLIFERATIVE RETINOPATHY WITHOUT MACULAR EDEMA, WITH LONG-TERM CURRENT USE OF INSULIN: ICD-10-CM

## 2022-11-08 DIAGNOSIS — H26.492 POSTERIOR CAPSULAR OPACIFICATION OF LEFT EYE, OBSCURING VISION: ICD-10-CM

## 2022-11-08 DIAGNOSIS — E08.3293 DIABETES MELLITUS DUE TO UNDERLYING CONDITION WITH BOTH EYES AFFECTED BY MILD NONPROLIFERATIVE RETINOPATHY WITHOUT MACULAR EDEMA, WITH LONG-TERM CURRENT USE OF INSULIN: ICD-10-CM

## 2022-11-08 DIAGNOSIS — Z86.69 HX OF DETACHED RETINA REPAIR: ICD-10-CM

## 2022-11-08 DIAGNOSIS — Z98.890 HX OF DETACHED RETINA REPAIR: ICD-10-CM

## 2022-11-08 DIAGNOSIS — H40.51X2 NEOVASCULAR GLAUCOMA, RIGHT, MODERATE STAGE: ICD-10-CM

## 2022-11-08 DIAGNOSIS — H35.82 OCULAR ISCHEMIC SYNDROME: Primary | ICD-10-CM

## 2022-11-08 PROCEDURE — 99214 PR OFFICE/OUTPT VISIT, EST, LEVL IV, 30-39 MIN: ICD-10-PCS | Mod: 24,S$PBB,, | Performed by: OPHTHALMOLOGY

## 2022-11-08 PROCEDURE — 99213 OFFICE O/P EST LOW 20 MIN: CPT | Mod: PBBFAC | Performed by: OPHTHALMOLOGY

## 2022-11-08 PROCEDURE — 92250 FUNDUS PHOTOGRAPHY W/I&R: CPT | Mod: PBBFAC | Performed by: OPHTHALMOLOGY

## 2022-11-08 PROCEDURE — 92250 COLOR FUNDUS PHOTOGRAPHY - OU - BOTH EYES: ICD-10-PCS | Mod: 26,S$PBB,, | Performed by: OPHTHALMOLOGY

## 2022-11-08 PROCEDURE — 99999 PR PBB SHADOW E&M-EST. PATIENT-LVL III: ICD-10-PCS | Mod: PBBFAC,,, | Performed by: OPHTHALMOLOGY

## 2022-11-08 PROCEDURE — 99214 OFFICE O/P EST MOD 30 MIN: CPT | Mod: 24,S$PBB,, | Performed by: OPHTHALMOLOGY

## 2022-11-08 PROCEDURE — 99999 PR PBB SHADOW E&M-EST. PATIENT-LVL III: CPT | Mod: PBBFAC,,, | Performed by: OPHTHALMOLOGY

## 2022-11-10 NOTE — PROGRESS NOTES
HPI    Patient here for Post op Yag OS/ DFE    Pt states vision is better in OS since laser       Brimonidine bid OD   Dorzolamide bid OD   Latanoprost qhs OD    Last edited by Flavia Reid MA on 11/8/2022  9:46 AM.            Assessment /Plan     For exam results, see Encounter Report.    Ocular ischemic syndrome  -     Color Fundus Photography - OU - Both Eyes    Pseudophakia of both eyes    Hx of detached retina repair SB / Cryo Left eye    Diabetes mellitus due to underlying condition with both eyes affected by mild nonproliferative retinopathy without macular edema, with long-term current use of insulin    Neovascular glaucoma, right, moderate stage  -     Color Fundus Photography - OU - Both Eyes    Posterior capsular opacification of left eye, obscuring vision        Patient with     A-Fib      POAG High Righ risk OD > OS  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    Pre-Tx 34 // 20  07/26/2021    ==> OIS OD --> re-discussed loss of vision and health care maintenance   Presents 12/29/2021 --> 43 // 18  + NVG // + NVA OD  + Iritis   + IVFA delayed fill with NVE  ==> Sees outside cardiology for A-Fib    CCT  543 // 565    <24 --> close // achieved --> re-discussed options / Xen gel / GDD or MP3 / G6 OD       Right eye --> CSM -> tolerating well --> MTMT  Xal q day  Alphagan BID --> consider Simbrinza --> patient to check coverage  Dorz BID   PF 1% BID --> off  A 1% q day --> off  On BB --> Does not tolerate      H/o Pseudophakia OU  OD with Dr. Castle in 2007   OS with Dr. Huynh in 2014 --> Sulcus 3 piece  Open Cap OU --> Partial VS PCO OS through visual axis --> discussed options    SP YAG Cap OS touch-up 10/11/2022   Doing well    Left eye  H/o RD OS  SP  SB/Cryo repair in 1980 --> Dr Payton    Right eye  SP ERM Peel // PPVx OD      NIDDM   Right eye + PDR c NVG  12/29/2021  - poorly controlled A1c for many years. ~9  Very difficult control 2/2 to gastroparesis  Diagnosed at age 36  + IO IRIS c AC Tap per retina  service    RD precautions:  Discussed symptoms of RD with increased flashes, floaters, decreasing vision.  Patient/Family to call and return immediately to clinic should the symptoms of RD occur. Voiced good understanding with Q & A.      Right eye 11/08/2022        Left eye 11/08/2022        OIS  Right eye NVA  12/29/2021          Plan:  RTC IOP and consider Glc sx options  RTC Keep fu with Retina service  RTC sooner prn with good understanding

## 2023-01-05 DIAGNOSIS — H40.51X2 NEOVASCULAR GLAUCOMA, RIGHT, MODERATE STAGE: Primary | ICD-10-CM

## 2023-01-09 DIAGNOSIS — H40.51X2 NEOVASCULAR GLAUCOMA, RIGHT, MODERATE STAGE: ICD-10-CM

## 2023-01-09 DIAGNOSIS — H40.053 OCULAR HYPERTENSION, BILATERAL: ICD-10-CM

## 2023-01-09 RX ORDER — DORZOLAMIDE HCL 20 MG/ML
1 SOLUTION/ DROPS OPHTHALMIC 2 TIMES DAILY
Qty: 10 ML | Refills: 6 | Status: SHIPPED | OUTPATIENT
Start: 2023-01-09 | End: 2023-08-02

## 2023-01-09 RX ORDER — BRIMONIDINE TARTRATE 2 MG/ML
1 SOLUTION/ DROPS OPHTHALMIC 2 TIMES DAILY
Qty: 10 ML | Refills: 6 | Status: SHIPPED | OUTPATIENT
Start: 2023-01-09 | End: 2023-05-03

## 2023-01-09 NOTE — TELEPHONE ENCOUNTER
Contacted pt- will keep using Dorzolamide and Brimonidine separately because of insurance.    ----- Message from Fauzia Carrasco sent at 1/9/2023  9:42 AM CST -----  Regarding: Prescripiton Issues  Pt asked to speak to office in regards to cost for prescription for brinzolamide-brimonidine (SIMBRINZA) 1-0.2 % DrpS. Pt asked if something else could be ordered.    Pts call back: [phone] (home)

## 2023-02-14 ENCOUNTER — OFFICE VISIT (OUTPATIENT)
Dept: OPHTHALMOLOGY | Facility: CLINIC | Age: 78
End: 2023-02-14
Payer: MEDICARE

## 2023-02-14 DIAGNOSIS — H35.82 OCULAR ISCHEMIC SYNDROME: ICD-10-CM

## 2023-02-14 DIAGNOSIS — H40.51X2 NEOVASCULAR GLAUCOMA, RIGHT, MODERATE STAGE: Primary | ICD-10-CM

## 2023-02-14 DIAGNOSIS — Z96.1 PSEUDOPHAKIA OF BOTH EYES: ICD-10-CM

## 2023-02-14 DIAGNOSIS — H20.00 ACUTE IRITIS OF RIGHT EYE: ICD-10-CM

## 2023-02-14 DIAGNOSIS — Z98.890 HX OF DETACHED RETINA REPAIR: ICD-10-CM

## 2023-02-14 DIAGNOSIS — Z86.69 HX OF DETACHED RETINA REPAIR: ICD-10-CM

## 2023-02-14 PROCEDURE — 92020 GONIOSCOPY: CPT | Mod: S$PBB,,, | Performed by: OPHTHALMOLOGY

## 2023-02-14 PROCEDURE — 92020 GONIOSCOPY: CPT | Mod: PBBFAC | Performed by: OPHTHALMOLOGY

## 2023-02-14 PROCEDURE — 99999 PR PBB SHADOW E&M-EST. PATIENT-LVL III: CPT | Mod: PBBFAC,,, | Performed by: OPHTHALMOLOGY

## 2023-02-14 PROCEDURE — 92020 PR SPECIAL EYE EVAL,GONIOSCOPY: ICD-10-PCS | Mod: S$PBB,,, | Performed by: OPHTHALMOLOGY

## 2023-02-14 PROCEDURE — 99999 PR PBB SHADOW E&M-EST. PATIENT-LVL III: ICD-10-PCS | Mod: PBBFAC,,, | Performed by: OPHTHALMOLOGY

## 2023-02-14 PROCEDURE — 99213 OFFICE O/P EST LOW 20 MIN: CPT | Mod: PBBFAC | Performed by: OPHTHALMOLOGY

## 2023-02-14 PROCEDURE — 99214 PR OFFICE/OUTPT VISIT, EST, LEVL IV, 30-39 MIN: ICD-10-PCS | Mod: S$PBB,,, | Performed by: OPHTHALMOLOGY

## 2023-02-14 PROCEDURE — 99214 OFFICE O/P EST MOD 30 MIN: CPT | Mod: S$PBB,,, | Performed by: OPHTHALMOLOGY

## 2023-02-14 NOTE — Clinical Note
Patient sees an outside PCP who is requesting a neurology consult: Right Hand / arm old injury but now with increasing left arm / hand pain, perhaps claw deformity and possible Thoracic Outlet syndrome (?) - PCP requesting EMG / Neurology consult.  Thank you

## 2023-02-22 PROBLEM — H20.00 ACUTE IRITIS OF RIGHT EYE: Status: ACTIVE | Noted: 2023-02-22

## 2023-02-22 NOTE — PROGRESS NOTES
HPI    DLS: 11/08/22    Pt states vision is doing about the same.     Brimonidine bid OD   Dorzolamide bid OD   Latanoprost qhs OD    Last edited by Flavia Reid MA on 2/14/2023 10:42 AM.            Assessment /Plan     For exam results, see Encounter Report.    Neovascular glaucoma, right, moderate stage    Hx of detached retina repair    Pseudophakia of both eyes    Ocular ischemic syndrome    Acute iritis of right eye          Patient with     A-Fib      POAG High Righ risk OD > OS  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    Pre-Tx 34 // 20  07/26/2021    ==> OIS OD --> re-discussed loss of vision and health care maintenance   Presents 12/29/2021 --> 43 // 18  + NVG // + NVA OD  + Iritis   + IVFA delayed fill with NVE  ==> Sees outside cardiology for A-Fib    Carotis US 01/05/2022  Right Mild plaque  Left small Plaque    ==> Fine Iritis OD & IOP 29 OD 02/14/2023  Discussed  Neg ROS      CCT  543 // 565    <24 --> not achieved --> re-discussed options / Xen gel / GDD or MP3 / G6 OD       Right eye --> Adjust -> tolerating well --> MTMT  Xal q day  Alphagan BID --> consider Simbrinza --> patient to check coverage  Dorz BID   PF 1% BID --> Restart for fine Iritis & IOP 29  02/14/2023 --> discussed resopnse  A 1% q day --> off  On BB --> Does not tolerate    Consider GDD OD as discussed      H/o Pseudophakia OU  OD with Dr. Castle in 2007   OS with Dr. Huynh in 2014 --> Sulcus 3 piece  Open Cap OU --> Partial VS PCO OS through visual axis --> discussed options    SP YAG Cap OS touch-up 10/11/2022   Doing well    Left eye  H/o RD OS  SP  SB/Cryo repair in 1980 --> Dr Payton    Right eye  SP ERM Peel // PPVx OD      NIDDM   Right eye + PDR c NVG  12/29/2021  - poorly controlled A1c for many years. ~9  Very difficult control 2/2 to gastroparesis  Diagnosed at age 36  + IO IRIS c AC Tap per retina service    RD precautions:  Discussed symptoms of RD with increased flashes, floaters, decreasing vision.  Patient/Family to call and  return immediately to clinic should the symptoms of RD occur. Voiced good understanding with Q & A.      Right eye 11/08/2022        Left eye 11/08/2022        OIS  Right eye NVA  12/29/2021          Plan:  RTC IOP & Iritis and consider Glc sx options  RTC Keep fu with Retina service  RTC sooner prn with good understanding

## 2023-03-10 ENCOUNTER — OFFICE VISIT (OUTPATIENT)
Dept: OPHTHALMOLOGY | Facility: CLINIC | Age: 78
End: 2023-03-10
Payer: MEDICARE

## 2023-03-10 DIAGNOSIS — H40.41X3 GLAUCOMA OF RIGHT EYE ASSOCIATED WITH OCULAR INFLAMMATION, SEVERE STAGE: ICD-10-CM

## 2023-03-10 DIAGNOSIS — Z96.1 PSEUDOPHAKIA OF BOTH EYES: ICD-10-CM

## 2023-03-10 DIAGNOSIS — H35.82 OCULAR ISCHEMIC SYNDROME: ICD-10-CM

## 2023-03-10 DIAGNOSIS — H40.51X2 NEOVASCULAR GLAUCOMA, RIGHT, MODERATE STAGE: Primary | ICD-10-CM

## 2023-03-10 DIAGNOSIS — H20.00 ACUTE IRITIS OF RIGHT EYE: ICD-10-CM

## 2023-03-10 PROCEDURE — 99214 OFFICE O/P EST MOD 30 MIN: CPT | Mod: S$PBB,,, | Performed by: OPHTHALMOLOGY

## 2023-03-10 PROCEDURE — 92020 GONIOSCOPY: CPT | Mod: S$PBB,,, | Performed by: OPHTHALMOLOGY

## 2023-03-10 PROCEDURE — 99214 PR OFFICE/OUTPT VISIT, EST, LEVL IV, 30-39 MIN: ICD-10-PCS | Mod: S$PBB,,, | Performed by: OPHTHALMOLOGY

## 2023-03-10 PROCEDURE — 99999 PR PBB SHADOW E&M-EST. PATIENT-LVL I: ICD-10-PCS | Mod: PBBFAC,,, | Performed by: OPHTHALMOLOGY

## 2023-03-10 PROCEDURE — 92020 GONIOSCOPY: CPT | Mod: PBBFAC | Performed by: OPHTHALMOLOGY

## 2023-03-10 PROCEDURE — 92020 PR SPECIAL EYE EVAL,GONIOSCOPY: ICD-10-PCS | Mod: S$PBB,,, | Performed by: OPHTHALMOLOGY

## 2023-03-10 PROCEDURE — 99211 OFF/OP EST MAY X REQ PHY/QHP: CPT | Mod: PBBFAC | Performed by: OPHTHALMOLOGY

## 2023-03-10 PROCEDURE — 99999 PR PBB SHADOW E&M-EST. PATIENT-LVL I: CPT | Mod: PBBFAC,,, | Performed by: OPHTHALMOLOGY

## 2023-03-10 NOTE — PROGRESS NOTES
HPI    DLS: 02/14/2023  NVG   PC IOL OU     Simbrinza BID OD  Latanoprost qhs OD   PF BID OD  Last edited by Di Soto MA on 3/10/2023  9:36 AM.            Assessment /Plan     For exam results, see Encounter Report.    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, severe stage          Patient with    started Dialysis @03/2023 --> feels better    A-Fib      POAG High Righ risk OD > OS  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    Pre-Tx 34 // 20  07/26/2021    ==> OIS OD --> re-discussed loss of vision and health care maintenance   Presents 12/29/2021 --> 43 // 18  + NVG // + NVA OD  + Iritis   + IVFA delayed fill with NVE  ==> Sees outside cardiology for A-Fib    Carotis US 01/05/2022  Right Mild plaque  Left small Plaque      Uveitic Glc OD --> in process  ==> Fine Iritis OD & IOP 29 OD 02/14/2023 --> resolved 03/10/2023  Re-Discussed  Neg ROS      CCT  543 // 565    <24 --> not achieved --> re-discussed options / Xen gel / GDD or MP3 / G6 OD       Right eye --> Adjust -> tolerating well --> MTMT  Xal q day  Alphagan BID --> consider Simbrinza --> patient to check coverage  Dorz BID   PF 1% BID --> switch to FML BID --> re-discussed steroid response --> patient to consider Glc Sx  A 1% q day --> off    On BB --> Does not tolerate    Consider GDD OD as re-discussed      H/o Pseudophakia OU  OD with Dr. Castle in 2007   OS with Dr. Huynh in 2014 --> Sulcus 3 piece  Open Cap OU --> Partial VS PCO OS through visual axis --> discussed options    SP YAG Cap OS touch-up 10/11/2022   Doing well    Left eye  H/o RD OS  SP  SB/Cryo repair in 1980 --> Dr Payton    Right eye  SP ERM Peel // PPVx OD      NIDDM   Right eye + PDR c NVG  12/29/2021  - poorly controlled A1c for many years. ~9  Very difficult control 2/2 to gastroparesis  Diagnosed at age 36  + IO IRIS c AC Tap per  retina service    RD precautions:  Discussed symptoms of RD with increased flashes, floaters, decreasing vision.  Patient/Family to call and return immediately to clinic should the symptoms of RD occur. Voiced good understanding with Q & A.      Right eye 11/08/2022        Left eye 11/08/2022        OIS  Right eye NVA  12/29/2021          Plan:  RTC 2 weeks IOP & Iritis and strongly consider Glc sx options  RTC Keep fu with Retina service  RTC sooner prn with good understanding

## 2023-03-24 ENCOUNTER — OFFICE VISIT (OUTPATIENT)
Dept: OPHTHALMOLOGY | Facility: CLINIC | Age: 78
End: 2023-03-24
Payer: MEDICARE

## 2023-03-24 DIAGNOSIS — Z96.1 PSEUDOPHAKIA OF BOTH EYES: ICD-10-CM

## 2023-03-24 DIAGNOSIS — H40.41X3 GLAUCOMA OF RIGHT EYE ASSOCIATED WITH OCULAR INFLAMMATION, SEVERE STAGE: Primary | ICD-10-CM

## 2023-03-24 DIAGNOSIS — H40.51X2 NEOVASCULAR GLAUCOMA, RIGHT, MODERATE STAGE: ICD-10-CM

## 2023-03-24 DIAGNOSIS — H20.00 ACUTE IRITIS OF RIGHT EYE: ICD-10-CM

## 2023-03-24 PROCEDURE — 99214 OFFICE O/P EST MOD 30 MIN: CPT | Mod: S$PBB,,, | Performed by: OPHTHALMOLOGY

## 2023-03-24 PROCEDURE — 99999 PR PBB SHADOW E&M-EST. PATIENT-LVL III: CPT | Mod: PBBFAC,,, | Performed by: OPHTHALMOLOGY

## 2023-03-24 PROCEDURE — 99213 OFFICE O/P EST LOW 20 MIN: CPT | Mod: PBBFAC | Performed by: OPHTHALMOLOGY

## 2023-03-24 PROCEDURE — 99999 PR PBB SHADOW E&M-EST. PATIENT-LVL III: ICD-10-PCS | Mod: PBBFAC,,, | Performed by: OPHTHALMOLOGY

## 2023-03-24 PROCEDURE — 99214 PR OFFICE/OUTPT VISIT, EST, LEVL IV, 30-39 MIN: ICD-10-PCS | Mod: S$PBB,,, | Performed by: OPHTHALMOLOGY

## 2023-03-24 RX ORDER — ASPIRIN 81 MG/1
81 TABLET ORAL
COMMUNITY
Start: 2023-03-16 | End: 2023-05-09 | Stop reason: CLARIF

## 2023-03-24 RX ORDER — AMIODARONE HYDROCHLORIDE 200 MG/1
200 TABLET ORAL 2 TIMES DAILY
COMMUNITY
Start: 2023-03-16 | End: 2023-05-05

## 2023-03-24 RX ORDER — FOLIC ACID 1 MG/1
1000 TABLET ORAL
COMMUNITY
Start: 2023-01-12 | End: 2023-12-06

## 2023-03-24 NOTE — PROGRESS NOTES
HPI     Glaucoma     Additional comments: 2 wk iop chk           Comments      NVG   PC IOL OU     Simbrinza TID OD  Latanoprost qhs OD   FML BID OD          Last edited by Yeyo Hermosillo on 3/24/2023  8:23 AM.            Assessment /Plan     For exam results, see Encounter Report.    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, severe stage    Acute iritis of right eye    Neovascular glaucoma, right, moderate stage    Pseudophakia of both eyes          Patient with    started Dialysis @03/2023 --> feels better    A-Fib --> recurrent + Eloquis        POAG High Righ risk OD > OS  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    Pre-Tx 34 // 20  07/26/2021    ==> OIS OD --> re-discussed loss of vision  Presents 12/29/2021 --> 43 // 18  + NVG // + NVA OD  + Iritis   + IVFA delayed fill with NVE  ==> Sees outside cardiology for A-Fib    Carotis US 01/05/2022  Right Mild plaque  Left small Plaque      Uveitic Glc OD --> in process  ==> Fine Iritis OD & IOP 29 OD 02/14/2023 --> resolved 03/10/2023  Re-Discussed  Neg ROS      CCT  543 // 565    <24 --> not achieved --> re-discussed options / Xen gel / GDD or MP3 / G6 OD     Patient declines Sx options 2/2 A-Fib issues and anticoag status 03/24/2023    Right eye  -> tolerating well --> MTMT / drop burden  Xal q day  Alphagan BIB  Dorz BID   Simbrinza --> not using 2/2 cost / lack of coverage  FML BID --> re-discussed steroid response  PF 1% --> holding  A 1% q day --> off    On BB --> Does not tolerate      H/o Pseudophakia OU  OD with Dr. Castle in 2007   OS with Dr. Huynh in 2014 --> Sulcus 3 piece  Open Cap OU --> Partial VS PCO OS through visual axis --> discussed options    SP YAG Cap OS touch-up 10/11/2022   Doing well    Left eye  H/o RD OS  SP  SB/Cryo repair in 1980 --> Dr Payton    Right eye  SP ERM Peel // PPVx OD      NIDDM   Right eye + PDR c NVG  12/29/2021  - poorly controlled A1c for many years. ~9  Very difficult control 2/2 to gastroparesis  Diagnosed  at age 36  + IO IRIS c AC Tap per retina service    RD precautions:  Discussed symptoms of RD with increased flashes, floaters, decreasing vision.  Patient/Family to call and return immediately to clinic should the symptoms of RD occur. Voiced good understanding with Q & A.      Right eye 11/08/2022        Left eye 11/08/2022        OIS  Right eye NVA  12/29/2021          Plan:  RTC 1 month IOP & Iritis and & re-assess Glc sx options  RTC Keep fu with Retina service  RTC sooner prn with good understanding

## 2023-03-30 ENCOUNTER — TELEPHONE (OUTPATIENT)
Dept: NEUROLOGY | Facility: CLINIC | Age: 78
End: 2023-03-30
Payer: MEDICARE

## 2023-03-31 ENCOUNTER — OFFICE VISIT (OUTPATIENT)
Dept: NEUROLOGY | Facility: CLINIC | Age: 78
End: 2023-03-31
Payer: MEDICARE

## 2023-03-31 ENCOUNTER — LAB VISIT (OUTPATIENT)
Dept: LAB | Facility: OTHER | Age: 78
End: 2023-03-31
Attending: STUDENT IN AN ORGANIZED HEALTH CARE EDUCATION/TRAINING PROGRAM
Payer: MEDICARE

## 2023-03-31 DIAGNOSIS — M54.12 CERVICAL RADICULOPATHY: Primary | ICD-10-CM

## 2023-03-31 DIAGNOSIS — M54.12 CERVICAL RADICULOPATHY: ICD-10-CM

## 2023-03-31 DIAGNOSIS — R27.0 ATAXIA, UNSPECIFIED: ICD-10-CM

## 2023-03-31 LAB — VIT B12 SERPL-MCNC: >2000 PG/ML (ref 210–950)

## 2023-03-31 PROCEDURE — 99204 PR OFFICE/OUTPT VISIT, NEW, LEVL IV, 45-59 MIN: ICD-10-PCS | Mod: S$PBB,,, | Performed by: STUDENT IN AN ORGANIZED HEALTH CARE EDUCATION/TRAINING PROGRAM

## 2023-03-31 PROCEDURE — 99215 OFFICE O/P EST HI 40 MIN: CPT | Mod: PBBFAC | Performed by: STUDENT IN AN ORGANIZED HEALTH CARE EDUCATION/TRAINING PROGRAM

## 2023-03-31 PROCEDURE — 99999 PR PBB SHADOW E&M-EST. PATIENT-LVL V: CPT | Mod: PBBFAC,,, | Performed by: STUDENT IN AN ORGANIZED HEALTH CARE EDUCATION/TRAINING PROGRAM

## 2023-03-31 PROCEDURE — 86334 IMMUNOFIX E-PHORESIS SERUM: CPT | Mod: 26,,, | Performed by: PATHOLOGY

## 2023-03-31 PROCEDURE — 86334 IMMUNOFIX E-PHORESIS SERUM: CPT | Performed by: STUDENT IN AN ORGANIZED HEALTH CARE EDUCATION/TRAINING PROGRAM

## 2023-03-31 PROCEDURE — 99204 OFFICE O/P NEW MOD 45 MIN: CPT | Mod: S$PBB,,, | Performed by: STUDENT IN AN ORGANIZED HEALTH CARE EDUCATION/TRAINING PROGRAM

## 2023-03-31 PROCEDURE — 82525 ASSAY OF COPPER: CPT | Performed by: STUDENT IN AN ORGANIZED HEALTH CARE EDUCATION/TRAINING PROGRAM

## 2023-03-31 PROCEDURE — 84630 ASSAY OF ZINC: CPT | Performed by: STUDENT IN AN ORGANIZED HEALTH CARE EDUCATION/TRAINING PROGRAM

## 2023-03-31 PROCEDURE — 99999 PR PBB SHADOW E&M-EST. PATIENT-LVL V: ICD-10-PCS | Mod: PBBFAC,,, | Performed by: STUDENT IN AN ORGANIZED HEALTH CARE EDUCATION/TRAINING PROGRAM

## 2023-03-31 PROCEDURE — 86334 PATHOLOGIST INTERPRETATION IFE: ICD-10-PCS | Mod: 26,,, | Performed by: PATHOLOGY

## 2023-03-31 PROCEDURE — 84165 PROTEIN E-PHORESIS SERUM: CPT | Performed by: STUDENT IN AN ORGANIZED HEALTH CARE EDUCATION/TRAINING PROGRAM

## 2023-03-31 PROCEDURE — 36415 COLL VENOUS BLD VENIPUNCTURE: CPT | Performed by: STUDENT IN AN ORGANIZED HEALTH CARE EDUCATION/TRAINING PROGRAM

## 2023-03-31 PROCEDURE — 84165 PATHOLOGIST INTERPRETATION SPE: ICD-10-PCS | Mod: 26,,, | Performed by: PATHOLOGY

## 2023-03-31 PROCEDURE — 84165 PROTEIN E-PHORESIS SERUM: CPT | Mod: 26,,, | Performed by: PATHOLOGY

## 2023-03-31 PROCEDURE — 82607 VITAMIN B-12: CPT | Performed by: STUDENT IN AN ORGANIZED HEALTH CARE EDUCATION/TRAINING PROGRAM

## 2023-03-31 NOTE — PROGRESS NOTES
Patient ID: [id number]  Referring Physician: Self, Aaareferral    Chief Complaint/Reason for Consult: Numbness     Subjective:     HPI  Jayda Deleon is a 77 y.o. female with DM Type I, diastolic HF, and paroxysmal A.fib. she is presenting today for evaluation of neuropathy.    2000 - Broke her right shoulder, did not  sundergoy for it. She has been having numbness and radiating pain down to the 4th and 5th finger since.   2021 - after receiving Moderna vaccine, she developed A.Fib, was started metoprolol and eliquis. She was taken off of it after a while due to side effects and she remained symptom free until 2 months ago when she went into A. Fib again. She has gone back to the ER since and was started on amiodarone and eliquis.  2-3 months age, she developed similar numbness and shooting pain from neck to 4th and 5th finger, but on the left. She has been very physically active recently with home and garden improvement. She believes she is not weak but she can not do anything due to the pain.     She also mentions overall worsening gait with the last few years.    PCP is Dr. Jose Martin Winn    Review of Systems   Cardiovascular:  Positive for palpitations.   Musculoskeletal:  Positive for gait problem and neck pain.   Neurological:  Positive for weakness (limited by pain) and numbness.   All other systems reviewed and are negative.    Past Medical History:  No past medical history on file.    Allergies:  Review of patient's allergies indicates:   Allergen Reactions    Alendronate Other (See Comments) and Tinitus     Jaw pain and deteriorations      Atorvastatin Other (See Comments)     Muscle pains      Diltiazem Shortness Of Breath and Other (See Comments)    Ibandronate Other (See Comments)     Osteoporosis( several broken bones)      Ibandronate sodium Other (See Comments)    Amlodipine-valsartan Other (See Comments)     Cough      Budesonide Other (See Comments)     Month raw and sore.      Cardizem  [diltiazem hcl]      Chest pain heart attack     Ciprofloxacin     Ibuprofen Other (See Comments)     Nasal congestion      Irbesartan Other (See Comments)     cough      Losartan Other (See Comments)     Dizziness      Meloxicam Other (See Comments)     Chest pains      Metoprolol Other (See Comments)     Nasal congestion,cough  and blockage.      Nebivolol Other (See Comments)     Cough      Omeprazole Hives and Other (See Comments)    Pantoprazole Diarrhea and Other (See Comments)    Reserpine-hydrochlorothiazide Other (See Comments)     Urinary retention. Clouds over her eyes.    Sulfa (sulfonamide antibiotics) Other (See Comments)     Headache      Atenolol Other (See Comments) and Palpitations     Pertinent Family History:  No family history on file.     Pertinent Social History:  Lives with .  Social History     Socioeconomic History    Marital status:    Tobacco Use    Smoking status: Never    Smokeless tobacco: Never     Medications:  Current Outpatient Medications   Medication Instructions    amiodarone (PACERONE) 200 mg, Oral, 2 times daily    amLODIPine (NORVASC) 5 MG tablet amlodipine 5 mg tablet    amlodipine-valsartan (EXFORGE) 5-160 mg per tablet 1 tablet    apixaban (ELIQUIS) 5 mg, Oral    ascorbic acid (vitamin C) (VITAMIN C) 1,000 mg, Oral, Daily    aspirin (ECOTRIN) 81 mg, Oral    brimonidine 0.2% (ALPHAGAN) 0.2 % Drop 1 drop, Both Eyes, 2 times daily    brinzolamide-brimonidine (SIMBRINZA) 1-0.2 % DrpS 1 drop, Right Eye, 2 times daily    chlorhexidine (PERIDEX) 0.12 % solution SMARTSI Teaspoon By Mouth 3 Times Daily    cloNIDine (CATAPRES) 0.1 mg, Oral, 3 times daily    dorzolamide (TRUSOPT) 2 % ophthalmic solution 1 drop, Both Eyes, 2 times daily    dorzolamide-timolol 2-0.5% (COSOPT) 22.3-6.8 mg/mL ophthalmic solution 1 drop    esomeprazole (NEXIUM) 20 mg, Oral    famotidine (PEPCID) 20 mg, Oral, Daily    folic acid (FOLVITE) 1,000 mcg, Oral    HUMALOG U-100 INSULIN 100  unit/mL injection Subcutaneous    HYDROcodone-acetaminophen (NORCO) 7.5-325 mg per tablet 1 tablet, Oral, Every 4 hours PRN    ibandronate (BONIVA) 150 mg tablet 1 tablet    insulin aspart U-100 (NOVOLOG) 100 unit/mL injection Novolog U-100 Insulin aspart 100 unit/mL subcutaneous solution   USE 1 VIAL PER WEEK FOR INSULIN PUMP    latanoprost 0.005 % ophthalmic solution 1 drop, Both Eyes, Nightly    tewcahhuo-[id number]-algal oil (METANX/FOLTANX RF) 3 mg-35 mg-2 mg -90.314 mg Cap 1 tablet    lutein 20 mg Cap Oral    mecobal/levomefolat Ca/B6 phos (METANX ORAL)   Metanx Methylcobal/Levomefolate/Pyridoxal Phos 3/35/2 mg Tab, See Instructions, 180 Unknown unit, 0, 0, Substitution Allowed, 90, Route to Pharmacy Electronically, [facility], [facility]_[id number], Instructions Replace Required Details, sandip, 10/04...    multivitamin-min-iron-FA-vit K 45 mg iron- 800 mcg-120 mcg Cap as directed    omega 3-dha-epa-fish oil 1,000 mg (120 mg-180 mg) Cap 1 capsule with a meal    ONETOUCH ULTRA TEST Strp 3 times daily      Objective:     Vitals:    03/31/23 1310   BP: (!) 173/76   Pulse: 77        General:  Well-appearing, well-nourished, NAD, cooperative    Neurologic Exam:   Awake, alert and oriented x3  Speech spontaneous and fluent, intact comprehension.   Adequate fund of knowledge, vocabulary.    CN II - CN XII:  PERRLA. EOM intact. No Nystagmus. No ophthalmoplegia.   Facial sensation is normal to light touch.   Facial expression is full and symmetric.   Hearing is intact bilaterally.   Palate elevates symmetrically.   SCM and Trapezius full strength bilaterally.   Tongue is midline.     Motor:  Bilateral thenar atrophy. No tremor.     Shoulder  Abd Shoulder Add Elbow   Flex Elbow  Ext Wrist   Flex Wrist  Ext Finger  Flex Finger  Ext Finger  Abd Finger   Add IO Opposition   Right 4+ 4+ 4+ 4+ 4+ 4+   4 4 4 3   Left 4+ 4+ 4+ 4+ 4+ 4+   4 4 4 3      Hip  Flex Hip  Ext Thigh   Abd Thigh  Add Knee  Flex Knee  Ext Plantar  Flex  Dorsiflex   Right 5 5   5 5 5 5   Left 5 5   5 5 5 5     Sensory:  Light touch: intact throughout.  Vibration:decreased at ankles   Proprioception: position intact in BUE  Romberg is deferred.    DTRs:   Biceps Brachioradialis Triceps Su Patellar Ankle Plantar   Right 2+ 2+   2+ 2+    Left 2+ 2+   2+ 2+      Coordination:  Finger to nose is slowed bilaterally.  impaired fine finger movements and rapid alternating movements on BUE.    Gait:  Wide based, tottering gait      Pertinent lab results    3/2023  CMP nl  TSH nl    Pertinent imaging results  *No relevant imaging available to review     Other pertinent studies  None    Assessment:   Jayda Deleon is a 77 y.o. female with multiple co morbidities who presents for evaluation of neck pain and LUE radiculopathy (pain and anesthesia). This might have been brought on by physical activity. I'd recommend MRI of C spine however, Ms Deleon wishes to hold off on that due to difficulty lying down. We will proceed with neck PT and possibly dry needling to get some relief. Should the pain get worse, will pause PT and obtain the MRI. Additionally, I will send labs to rule out metabolic and toxic etiologies.     1. Cervical radiculopathy    2. Ataxia, unspecified       Plan:     Imaging: will hold off on MRI Cervical Spine per her request  Labs: copper, zinc, B12, SPEP/LATA, heavy metal screen  Referrals: Neck physical therapy (including dry needling)  Follow up: RTC in 2 months     Plan was discussed in detail with the patient, who is in agreement.        Kortney Rizo MD    [facility]  03/31/2023

## 2023-04-03 ENCOUNTER — TELEPHONE (OUTPATIENT)
Dept: NEUROLOGY | Facility: CLINIC | Age: 78
End: 2023-04-03
Payer: MEDICARE

## 2023-04-03 VITALS
DIASTOLIC BLOOD PRESSURE: 76 MMHG | HEART RATE: 77 BPM | WEIGHT: 136 LBS | HEIGHT: 66 IN | SYSTOLIC BLOOD PRESSURE: 173 MMHG | BODY MASS INDEX: 21.86 KG/M2

## 2023-04-03 LAB
ALBUMIN SERPL ELPH-MCNC: 4.27 G/DL (ref 3.35–5.55)
ALPHA1 GLOB SERPL ELPH-MCNC: 0.34 G/DL (ref 0.17–0.41)
ALPHA2 GLOB SERPL ELPH-MCNC: 0.67 G/DL (ref 0.43–0.99)
ARSENIC BLD-MCNC: <1 NG/ML
B-GLOBULIN SERPL ELPH-MCNC: 0.88 G/DL (ref 0.5–1.1)
CADMIUM BLD-MCNC: <0.2 NG/ML
CITY: NORMAL
COPPER SERPL-MCNC: 1592 UG/L (ref 810–1990)
COUNTY: NORMAL
GAMMA GLOB SERPL ELPH-MCNC: 1.15 G/DL (ref 0.67–1.58)
GUARDIAN FIRST NAME: NORMAL
GUARDIAN LAST NAME: NORMAL
HOME PHONE: NORMAL
INTERPRETATION SERPL IFE-IMP: NORMAL
LEAD BLD-MCNC: 1.2 MCG/DL
MERCURY BLD-MCNC: <1 NG/ML
PATHOLOGIST INTERPRETATION IFE: NORMAL
PATHOLOGIST INTERPRETATION SPE: NORMAL
PROT SERPL-MCNC: 7.3 G/DL (ref 6–8.4)
RACE: NORMAL
STATE: NORMAL
STREET ADDRESS: NORMAL
VENOUS/CAPILLARY: NORMAL
ZINC SERPL-MCNC: 74 UG/DL (ref 60–130)
ZIP: NORMAL

## 2023-04-03 NOTE — TELEPHONE ENCOUNTER
----- Message from Shahana Richards sent at 4/3/2023 10:29 AM CDT -----  .Type: Patient Call Back    Who called:Self     What is the request in detail: Stated there is some information on her after visit summary that are incorrect     Can the clinic reply by [facility]? No     Would the patient rather a call back or a response via [facility]? Call back     Best call back number:.[phone] (home)       Additional Information:

## 2023-04-05 ENCOUNTER — CLINICAL SUPPORT (OUTPATIENT)
Dept: REHABILITATION | Facility: OTHER | Age: 78
End: 2023-04-05
Attending: STUDENT IN AN ORGANIZED HEALTH CARE EDUCATION/TRAINING PROGRAM
Payer: MEDICARE

## 2023-04-05 DIAGNOSIS — Z78.9 IMPAIRED MOBILITY AND ACTIVITIES OF DAILY LIVING: ICD-10-CM

## 2023-04-05 DIAGNOSIS — M53.82 IMPAIRED RANGE OF MOTION OF CERVICAL SPINE: ICD-10-CM

## 2023-04-05 DIAGNOSIS — R29.898 WEAKNESS OF BOTH UPPER EXTREMITIES: ICD-10-CM

## 2023-04-05 DIAGNOSIS — Z74.09 IMPAIRED MOBILITY AND ACTIVITIES OF DAILY LIVING: ICD-10-CM

## 2023-04-05 DIAGNOSIS — M54.12 CERVICAL RADICULOPATHY: ICD-10-CM

## 2023-04-05 PROCEDURE — 97162 PT EVAL MOD COMPLEX 30 MIN: CPT

## 2023-04-05 PROCEDURE — 97110 THERAPEUTIC EXERCISES: CPT

## 2023-04-05 PROCEDURE — 97530 THERAPEUTIC ACTIVITIES: CPT

## 2023-04-06 ENCOUNTER — TELEPHONE (OUTPATIENT)
Dept: OPHTHALMOLOGY | Facility: CLINIC | Age: 78
End: 2023-04-06
Payer: MEDICARE

## 2023-04-06 NOTE — TELEPHONE ENCOUNTER
----- Message from Fauzia Carrasco sent at 4/6/2023  8:38 AM CDT -----  Regarding: Surgery Inquiry  Pt returning missed call from Dr. Prater instructing her to call office about surgery.    Call back- [phone]

## 2023-04-06 NOTE — TELEPHONE ENCOUNTER
Dr. Prater spoke to Mrs. Deleon at length about her options as it relates to her glaucoma and feeling that she may be losing more vision. Pt is unable to come off of her Elequis, so a glaucoma tube may not be the best option.   Dr. Sy also explained that he may be able to do a G6 laser. Explained that it may not get the pressure exactly where  they would like it, it may be a safer option since she can not stop her Elequis.   Also talked about how this is a non visual eye and that doing a more invasive tube sx may not be worth the risk.   Pt verbalized understanding.  Dr. Prater and pt will talk on Monday

## 2023-04-07 PROBLEM — Z74.09 IMPAIRED MOBILITY AND ACTIVITIES OF DAILY LIVING: Status: ACTIVE | Noted: 2023-04-07

## 2023-04-07 PROBLEM — Z78.9 IMPAIRED MOBILITY AND ACTIVITIES OF DAILY LIVING: Status: ACTIVE | Noted: 2023-04-07

## 2023-04-07 PROBLEM — R29.898 WEAKNESS OF BOTH UPPER EXTREMITIES: Status: ACTIVE | Noted: 2023-04-07

## 2023-04-07 PROBLEM — M53.82 IMPAIRED RANGE OF MOTION OF CERVICAL SPINE: Status: ACTIVE | Noted: 2023-04-07

## 2023-04-07 NOTE — PLAN OF CARE
[facility]  Physical Therapy Initial Evaluation    Date: 4/5/2023   Name: Jayda Pabon Adrienne  Clinic Number: [id number]    Therapy Diagnosis:   Encounter Diagnoses   Name Primary?    Cervical radiculopathy     Impaired range of motion of cervical spine     Weakness of both upper extremities     Impaired mobility and activities of daily living      Physician: Kortney Rizo MD    Physician Orders: PT Eval and Treat   Medical Diagnosis from Referral: M54.12 (ICD-10-CM) - Cervical radiculopathy  Evaluation Date: 4/5/2023  Authorization Period Expiration: 3/31/25  Plan of Care Expiration: 7/5/23  Visit # / Visits authorized: 1/ 20   Progress Note Due: 5/5/23  FOTO: 1/ 1    Precautions: Standard, Diabetes, and Fall, A-fib, R eye nearly blind, difficulty being in prolonged cervical extension, blood clotting disorder, impaired balance, significant co-morbidities     Time In: 8:01a  Time Out: 9:10a  Total Appointment Time (timed & untimed codes): 69 minutes    Subjective   Date of onset: neck pain for significant amount of years, Recent exacerbation began in Jan 2023  History of current condition - Jayda reports: she was very active gardening and walking 6 miles a day. She reports she has intermittent paraesthesias throughout body that would last in 1 body part for about 6 months then would move body parts, has been happening since 1980's. She reports the 1st time in mid 2010s, she experienced full extremity numbness from shoulder to hand/fingers of L hand. She prevents with a very complicated medical history intermittent fluctuation in sx.  Current chief complaints include paresthesias of B 4th and 5th digits and limited ability to participate in desired activities. She was very significant medical history, please see chart for details. She reports her  just started undergoing dialysis treatment and her life is very, very busy at the moment. She is unsure how much time she will  "be able to commit to physical therapy at this time. She reports she has to sleep on a couch. She reports she is losing vision in R eye. She reports sx have previously limited ability to perform activities such as opening door, gardening, ADLs, cooking but currently is less limited at this time.     Per MD notes: "Jayda Deleon is a 77 y.o. female with DM Type I, diastolic HF, and paroxysmal A.fib. she is presenting today for evaluation of neuropathy.     2000 - Broke her right shoulder, did not  sundergoy for it. She has been having numbness and radiating pain down to the 4th and 5th finger since.   2021 - after receiving Moderna vaccine, she developed A.Fib, was started metoprolol and eliquis. She was taken off of it after a while due to side effects and she remained symptom free until 2 months ago when she went into A. Fib again. She has gone back to the ER since and was started on amiodarone and eliquis.  2-3 months age, she developed similar numbness and shooting pain from neck to 4th and 5th finger, but on the left. She has been very physically active recently with home and garden improvement. She believes she is not weak but she can not do anything due to the pain.      She also mentions overall worsening gait with the last few years."    KIRTI:     Any dizziness or headaches:   Pain radiates: to B hands  Pain constant or intermittent: intermittent  Any injection:     Pain:  Current 0/10, worst 6/10, best 0/10   Location: bilateral upper extremities total numbness of R hand mostly 4-5th fingers    Description: Tingling, Numb, Sharp, and Shooting  Aggravating Factors: Night Time, cervical movements  Easing Factors: positional changes,       Prior Therapy: Previous PT  Prior Treatment: none  Social History:  lives with their spouse  Occupation: Retired  Prior Level of Function: independent   Current Level of Function: independent- primary caregiver of her  who is newly on dialysis     Pts goals: " to be able to maintain active lifestyle throughout     Imaging, MRI studies: not on file     Medical History:   No past medical history on file.    Surgical History:   Jayda Deleon  has no past surgical history on file.    Medications:   Jayda has a current medication list which includes the following prescription(s): amiodarone, amlodipine, amlodipine-valsartan, apixaban, ascorbic acid (vitamin c), aspirin, brimonidine 0.2%, brinzolamide-brimonidine, chlorhexidine, clonidine, dorzolamide, dorzolamide-timolol 2-0.5%, esomeprazole, famotidine, folic acid, humalog u-100 insulin, hydrocodone-acetaminophen, ibandronate, insulin aspart u-100, latanoprost, dsrkvwujg-s0-eqr46-algal oil, lutein, mecobal/levomefolat ca/b6 phos, multivitamin-min-iron-fa-vit k, and onetouch ultra test.    Allergies:   Review of patient's allergies indicates:   Allergen Reactions    Alendronate Other (See Comments) and Tinitus     Jaw pain and deteriorations      Atorvastatin Other (See Comments)     Muscle pains      Diltiazem Shortness Of Breath and Other (See Comments)    Ibandronate Other (See Comments)     Osteoporosis( several broken bones)      Ibandronate sodium Other (See Comments)    Amlodipine-valsartan Other (See Comments)     Cough      Budesonide Other (See Comments)     Month raw and sore.      Cardizem [diltiazem hcl]      Chest pain heart attack     Ciprofloxacin     Ibuprofen Other (See Comments)     Nasal congestion      Irbesartan Other (See Comments)     cough      Losartan Other (See Comments)     Dizziness      Meloxicam Other (See Comments)     Chest pains      Metoprolol Other (See Comments)     Nasal congestion,cough  and blockage.      Nebivolol Other (See Comments)     Cough      Omeprazole Hives and Other (See Comments)    Pantoprazole Diarrhea and Other (See Comments)    Reserpine-hydrochlorothiazide Other (See Comments)     Urinary retention. Clouds over her eyes.    Sulfa (sulfonamide antibiotics)  Other (See Comments)     Headache      Atenolol Other (See Comments) and Palpitations          Objective     Posture Alignment: forward head;slouched posture;increased kyphosis    Sensation: Light touch: Intact to B hands and UE light/dull touch      Range of Motion - MOVEMENT LOSS    ROM Loss   Flexion within functional limits   Extension minimal loss stretch of B upper traps   Side bending Right moderate loss p! On R   Side bending Left major loss p! On R   Rotation Right within functional limits p! On R    Rotation Left within functional limits p! On R   Protraction within functional limits   Retraction  major loss p! In neck       REPEATED TEST MOVEMENTS:   Repeated Protraction in Sitting no effect   Repeated Flexion in Sitting no effect   Repeated Retraction in Sitting  no effect  produced in neck   Repeated Retraction Extension in Sitting no effect  produced stretching in B UE        Upper Extremity Strength: DNT this date d/t time restrictions, plan to next visit Upper Limb Neurodynamic testing: DNT this date d/t time restrictions, plan to next visit     Thoracic mobility: Upper Limb Neurodynamic testing: DNT this date d/t time restrictions, plan to next visit       PT Evaluation Completed? Yes  Discussed Plan of Care with patient: Yes    CMS Impairment/Limitation/Restriction for FOTO Cervical Survey    Therapist reviewed FOTO scores for Jayda Deleon on 4/5/2023.   FOTO documents entered into [employer] - see Media section.    Limitation Score: 46%%  Goal: 37%       TREATMENT   Treatment Time In: 8:20a  Treatment Time Out: 8:45a  Total Treatment time separate from Evaluation: 10 minutes    Jayda received therapeutic exercises to develop ROM, flexibility, and posture for 15 minutes including:  SOC  Wall posture drill  Chin tuck  NICANOR    Jayda participated in dynamic functional therapeutic activities to improve functional performance for 15  minutes, including:  -Educated provided on centralization of  symptoms with directional preference along with importance of repeated or sustained exercises to decrease distal symptoms  -Educated patient on importance of increase time spent in extension ROM to allow for equal forces to spine and prevent pain with consistent, daily flexion activities  --Discussed to difference between mechanical pain and associated symptoms and inflammatory pain and how this related to pain and impairment  -Educated on relevant anatomy       Home Exercises and Patient Education Provided  SOC  Wall posture drill  Chin tuck  EIS    Education provided:   -See above    Written Home Exercises Provided: yes.  Exercises were reviewed and Jayda was able to demonstrate them prior to the end of the session.  Jayda demonstrated fair  understanding of the education provided.     See EMR under Patient Instructions for exercises provided 4/5/2023.    Assessment   Jayda is a 77 y.o. female referred to outpatient Physical Therapy with a medical diagnosis of M54.12 (ICD-10-CM) - Cervical radiculopathy. Pt presents with signs and sx consistent with this dx including limited, painful cervical ROM, paresthesias in B UE, impaired postural, and observed and reported B UE weakness leading to decreased functional strength, mobility, and intermittent increases in pain during ADLs, functional mobility, leisure activities, and care giving for . Repeated motion testing relieves inconclusive findings at this time, limited by time constraints during evaluation, significant co-morbidities, and patient's understand of exercises. Plan to continue assessment and evaluation of symptoms during subsequent follow ups. Patient reports significant anticipated barriers to participation in skilled PT including 's health, unstable and change health concerns, limited understanding on presents of symptoms, and limited time to devote to her personal goals. Patient was given significant education this date on  self-management strategies in case there is lapse in return to clinic. Patient advised to obtain Treat Your Own Neck text in order to increase education and understanding cervical involvement. HEP was given to include postural exercises and general cervical mobility.   Pt would benefit from skilled PT focused on improved lumbar mobility, trunk strengthening, neuro re-education for posture and muscular recruitment, and education on positional offloading techniques with self management to decrease lumbar and prevent any symptom exacerbations.       Pt prognosis is Fair.   Pt will benefit from skilled outpatient Physical Therapy to address the deficits stated above and in the chart below, provide pt/family education, and to maximize pt's level of independence.     Plan of care discussed with patient: Yes  Pt's spiritual, cultural and educational needs considered and patient is agreeable to the plan of care and goals as stated below:     Anticipated Barriers for therapy: chronicity of sx, presentation with significant comorbidities    Medical Necessity is demonstrated by the following  History  Co-morbidities and personal factors that may impact the plan of care Co-morbidities:   CHF, coping style/mechanism, diabetes, HTN, and post-covid, CMT B, peripheral neuropathy, multiple previously fractures    Personal Factors:   no deficits     mod   Examination  Body Structures and Functions, activity limitations and participation restrictions that may impact the plan of care Body Regions:   neck  upper extremities    Body Systems:    gross symmetry  ROM  strength  gross coordinated movement  balance  gait  transfers  motor control    Participation Restrictions:   Limited in ability to go on walks, caring for , has to sleep on her couch    Activity limitations:   Learning and applying knowledge  no deficits    General Tasks and Commands  no deficits    Communication  no deficits    Mobility  lifting and carrying  objects  fine hand use (grasping/picking up)    Self care  washing oneself (bathing, drying, washing hands)  caring for body parts (brushing teeth, shaving, grooming)    Domestic Life  cooking  doing house work (cleaning house, washing dishes, laundry)    Interactions/Relationships  no deficits    Life Areas  no deficits    Community and Social Life  no deficits         Complexity: mod   Clinical Presentation evolving clinical presentation with changing clinical characteristics mod   Decision Making/ Complexity Score: low       GOALS: Short Term Goals: 4 weeks  1.Report decreased in pain at worse less than  <   / =  4  /10  to increase tolerance for functional activities. On going  2. Pt to improve range of motion cervical by 25% to allow for improved functional mobility to allow for improvement in IADLs.  On going  3. Increased  B UE MMT 1/2  grade to increase tolerance for ADL and work activities. On going  5. Pt to tolerate HEP to improve ROM and independence with ADL's. On going    Long Term Goals: 8 weeks  1.Report decreased in pain at worse less than  <   / =  3  /10  to increase tolerance for functional activities. On going  2.Pt to improve range of motion by 75% to allow for improved functional mobility to allow for improvement in IADLs. On going  3.Increased B UE MMT  1 grade  to increase tolerance for ADL and work activities.On going  4.  Pt will report 37% on FOTO neck survey score for neck pain disability to demonstrate decrease in disability and improvement in neck pain.On going  5. Pt to be Independent with HEP to improve ROM and independence with ADL's. On going  6.. Pt to demonstrate ability to independently control and reduce their pain through posture positioning and mechanical movements throughout a typical day.  (approp and ongoing)  7. Pt will report ability to participate in ~10 minutes of gardening activities with decrease in reports pain and sx in order to improve quality of life. Pt goal  on going     Plan   Plan of care Certification: 4/5/2023 to 7/5/2023.    Outpatient Physical Therapy 1 times weekly for 8 weeks to include the following interventions: Cervical/Lumbar Traction, Manual Therapy, Moist Heat/ Ice, Neuromuscular Re-ed, Patient Education, Therapeutic Activities, and Therapeutic Exercise. Caryl Deal, PT      I CERTIFY THE NEED FOR THESE SERVICES FURNISHED UNDER THIS PLAN OF TREATMENT AND WHILE UNDER MY CARE   Physician's comments:     Physician's Signature: ___________________________________________________

## 2023-05-02 NOTE — PROGRESS NOTES
[facility]   Physical Therapy Treatment Note      Name: Jayda Pabon [facility]  Clinic Number: [id number]    Therapy Diagnosis:   Encounter Diagnoses   Name Primary?    Impaired range of motion of cervical spine Yes    Weakness of both upper extremities     Impaired mobility and activities of daily living      Physician: Kortney Rizo MD    Visit Date: 5/3/2023    Physician Orders: PT Eval and Treat   Medical Diagnosis from Referral: M54.12 (ICD-10-CM) - Cervical radiculopathy  Evaluation Date: 4/5/2023  Authorization Period Expiration: 3/31/25  Plan of Care Expiration: 7/5/23  Visit # / Visits authorized: 3/ 20   Progress Note Due: 5/5/23  FOTO: 1/ 1    PTA Visit #: 0/5     Time In: 8:00 a  Time Out: 9:00 a  Total Billable Time: 60 minutes    Subjective     Pt reports: she has been intermittently performing her HEP but struggled d/t difficulties being a caregiver to . She read TYON text with good understanding. She reports she has been falling more often over the last few weeks and she is unsure why. She states she has been fall more often and is unsure what it is causing it. She has neuro FU in a month and eye appt this week to address.       She was compliant with home exercise program.  Response to previous treatment: good  Functional change: no significant improvement     Pain: 2/10  Location: bilateral hands- numbness      Objective      Range of Motion - MOVEMENT LOSS     ROM Loss 4/6 5/3   Flexion within functional limits WNL   Extension minimal loss stretch of B upper traps minimal   Side bending Right moderate loss p! On R Minimal    Side bending Left major loss p! On R Moderate    Rotation Right within functional limits p! On R  WNL   Rotation Left within functional limits p! On R WNL   Protraction within functional limits WNL   Retraction  major loss p! In neck Minimal        Treatment     Jayda received the treatments listed below:      therapeutic exercises to  develop strength, endurance, and ROM for 60 minutes including:  Cervical retraction against doorway 2x10  Doorway pec stretch x3nins  PROM to R UE [id number]   AAROM shoulder IR with elbow flexion x3mins  R UE D2 2x10  Scapular retraction 2x10  Scapular depression 2x10  Rows no resistance 2x10  Cross body B abduction 2x10    therapeutic activities to improve functional performance for -  minutes, including:      Patient Education and Home Exercises       Education provided:   - Reviewed HEP, fall prevention strategies, importance of following up with neurology and ophthalmology appts to address concerns    Written Home Exercises Provided: yes. Exercises were reviewed and Jayda was able to demonstrate them prior to the end of the session.  Jayda demonstrated good  understanding of the education provided. See EMR under Patient Instructions for exercises provided during therapy sessions    Assessment     Jayda Deleon tolerated PT session well with moderate  complaints of pain or discomfort. Reviewed HEP this date with emphasis on importance of adhering. Exercises and activities this date emphasized improve R shoulder ROM, postural awareness and control, improve R UE strengthening, and continued assessment of sx. This therapist is concerned with pt's reported increase in instability with increased fall risk. Will continue to assess and planning to contact pt's neurologist to discuss these concerns. Pt continues to exhibits significantly impaired R >L UE ROM with increase pain that presently inconsistently with intermittent radiation from R shoulder to R forearm. This therapist believe there is a cervical or thoracic neurological impingement causes some of pt's sx. Will continue to assess. Patient is very fear avoided with most movement requiring significant verbal and tactile cues for relaxation to decrease guarding throughout. Continue to progress as tolerated.       Jayda Is progressing well towards  her goals.     Pt prognosis is Fair.     Pt will continue to benefit from skilled outpatient physical therapy to address the deficits listed in the problem list box on initial evaluation, provide pt/family education and to maximize pt's level of independence in the home and community environment.     Pt's spiritual, cultural and educational needs considered and pt agreeable to plan of care and goals.     Anticipated barriers to physical therapy: chronicity of sx, presentation with significant comorbidities    Goals:   Short Term Goals: 4 weeks  1.Report decreased in pain at worse less than  <   / =  4  /10  to increase tolerance for functional activities. On going  2. Pt to improve range of motion cervical by 25% to allow for improved functional mobility to allow for improvement in IADLs.  On going  3. Increased  B UE MMT 1/2  grade to increase tolerance for ADL and work activities. On going  5. Pt to tolerate HEP to improve ROM and independence with ADL's. On going     Long Term Goals: 8 weeks  1.Report decreased in pain at worse less than  <   / =  3  /10  to increase tolerance for functional activities. On going  2.Pt to improve range of motion by 75% to allow for improved functional mobility to allow for improvement in IADLs. On going  3.Increased B UE MMT  1 grade  to increase tolerance for ADL and work activities.On going  4.  Pt will report 37% on FOTO neck survey score for neck pain disability to demonstrate decrease in disability and improvement in neck pain.On going  5. Pt to be Independent with HEP to improve ROM and independence with ADL's. On going  6.. Pt to demonstrate ability to independently control and reduce their pain through posture positioning and mechanical movements throughout a typical day.  (approp and ongoing)  7. Pt will report ability to participate in ~10 minutes of gardening activities with decrease in reports pain and sx in order to improve quality of life. Pt goal on going     Plan    Plan of care Certification: 4/5/2023 to 7/5/2023.     Outpatient Physical Therapy 1 times weekly for 8 weeks to include the following interventions: Cervical/Lumbar Traction, Manual Therapy, Moist Heat/ Ice, Neuromuscular Re-ed, Patient Education, Therapeutic Activities, and Therapeutic Exercise. Caryl Deal, PT   5/3/2023

## 2023-05-03 ENCOUNTER — CLINICAL SUPPORT (OUTPATIENT)
Dept: REHABILITATION | Facility: OTHER | Age: 78
End: 2023-05-03
Payer: MEDICARE

## 2023-05-03 DIAGNOSIS — Z78.9 IMPAIRED MOBILITY AND ACTIVITIES OF DAILY LIVING: ICD-10-CM

## 2023-05-03 DIAGNOSIS — M53.82 IMPAIRED RANGE OF MOTION OF CERVICAL SPINE: Primary | ICD-10-CM

## 2023-05-03 DIAGNOSIS — R29.898 WEAKNESS OF BOTH UPPER EXTREMITIES: ICD-10-CM

## 2023-05-03 DIAGNOSIS — Z74.09 IMPAIRED MOBILITY AND ACTIVITIES OF DAILY LIVING: ICD-10-CM

## 2023-05-03 PROCEDURE — 97110 THERAPEUTIC EXERCISES: CPT

## 2023-05-05 ENCOUNTER — OFFICE VISIT (OUTPATIENT)
Dept: OPHTHALMOLOGY | Facility: CLINIC | Age: 78
End: 2023-05-05
Payer: MEDICARE

## 2023-05-05 DIAGNOSIS — H40.41X3 GLAUCOMA OF RIGHT EYE ASSOCIATED WITH OCULAR INFLAMMATION, SEVERE STAGE: ICD-10-CM

## 2023-05-05 DIAGNOSIS — H40.51X3 NEOVASCULAR GLAUCOMA OF RIGHT EYE, SEVERE STAGE: Primary | ICD-10-CM

## 2023-05-05 DIAGNOSIS — Z96.1 PSEUDOPHAKIA OF BOTH EYES: ICD-10-CM

## 2023-05-05 DIAGNOSIS — H54.10 BLINDNESS AND LOW VISION: ICD-10-CM

## 2023-05-05 DIAGNOSIS — H35.82 OCULAR ISCHEMIC SYNDROME: ICD-10-CM

## 2023-05-05 PROCEDURE — 92020 PR SPECIAL EYE EVAL,GONIOSCOPY: ICD-10-PCS | Mod: S$PBB,,, | Performed by: OPHTHALMOLOGY

## 2023-05-05 PROCEDURE — 99214 PR OFFICE/OUTPT VISIT, EST, LEVL IV, 30-39 MIN: ICD-10-PCS | Mod: S$PBB,,, | Performed by: OPHTHALMOLOGY

## 2023-05-05 PROCEDURE — 99999 PR PBB SHADOW E&M-EST. PATIENT-LVL III: ICD-10-PCS | Mod: PBBFAC,,, | Performed by: OPHTHALMOLOGY

## 2023-05-05 PROCEDURE — 99999 PR PBB SHADOW E&M-EST. PATIENT-LVL III: CPT | Mod: PBBFAC,,, | Performed by: OPHTHALMOLOGY

## 2023-05-05 PROCEDURE — 99213 OFFICE O/P EST LOW 20 MIN: CPT | Mod: PBBFAC | Performed by: OPHTHALMOLOGY

## 2023-05-05 PROCEDURE — 92020 GONIOSCOPY: CPT | Mod: PBBFAC | Performed by: OPHTHALMOLOGY

## 2023-05-05 PROCEDURE — 99214 OFFICE O/P EST MOD 30 MIN: CPT | Mod: S$PBB,,, | Performed by: OPHTHALMOLOGY

## 2023-05-05 PROCEDURE — 92020 GONIOSCOPY: CPT | Mod: S$PBB,,, | Performed by: OPHTHALMOLOGY

## 2023-05-05 NOTE — PROGRESS NOTES
"HPI      NVG   PC IOL OU   VA OD " felt black spot over top part of eye". VA OS no changes since last   visit.    Simbrinza TID OD  Latanoprost HS OD   FML BID OD  Last edited by Gilda Rojas on 5/5/2023  8:57 AM.            Assessment /Plan     For exam results, see Encounter Report.    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    Pre-Tx 34 // 20  07/26/2021    ==> OIS OD --> re-discussed loss of vision  Presents 12/29/2021 --> 43 // 18  + NVG // + NVA OD  + Iritis   + IVFA delayed fill with NVE  ==> Sees outside cardiology for A-Fib    Carotis US 01/05/2022  Right Mild plaque  Left small Plaque      Uveitic Glc OD  ==> Fine Iritis OD & IOP 29 OD 02/14/2023 --> resolved 03/10/2023  Re-Discussed  Neg ROS      CCT  543 // 565    <24 --> not achieved --> re-discussed options / Xen gel / GDD or G-Probe OD    Patient re-declines Sx options 2/2 A-Fib issues and anticoag status 03/24/2023 & 05/05/2023    Right eye  -> tolerating well --> MTMT / drop burden --> CSM  Xal q day  Alphagan BIB  Dorz BID   Simbrinza --> not using 2/2 cost / lack of coverage  FML BID --> re-discussed steroid response  PF 1% --> holding  A 1% q day --> off    On BB --> Does not tolerate      H/o Pseudophakia OU  OD with Dr. Castle in 2007   OS with Dr. Huynh in 2014 --> Sulcus 3 piece  Open Cap OU --> Partial VS PCO OS through visual axis --> discussed options    SP YAG Cap OS touch-up 10/11/2022   Doing well    Left eye  H/o RD OS  SP  SB/Cryo repair in " 1980 --> Dr Payton    Right eye  SP ERM Peel // PPVx OD      NIDDM   Right eye + PDR c NVG  12/29/2021  - poorly controlled A1c for many years. ~9  Very difficult control 2/2 to gastroparesis  Diagnosed at age 36  + IO IRIS c AC Tap per retina service    RD precautions:  Discussed symptoms of RD with increased flashes, floaters, decreasing vision.  Patient/Family to call and return immediately to clinic should the symptoms of RD occur. Voiced good understanding with Q & A.      Right eye 11/08/2022        Left eye 11/08/2022        OIS  Right eye NVA  12/29/2021          Right eye G-Probe  Glaucoma Incisional Surgery Consent: Patient with poorly controlled glaucoma on MTMT with IOP deemed too high for the health of the eye - patient reporting progressive VF loss OD.  Discussed with patient options, risks and benefits, expectations of glaucoma surgery with questions and answers to facilitate discussion.  The patient and family voice good understanding and patient wishes to proceed with surgery.  The patient will likely benefit from surgery and patient signed consent for Right Eye.    Slow Burn [place] Technique  3210-4853 mW for 4000 msec  4-6/ quadrant  No pops      Plan:  RTC G-Probe OD  RTC Keep fu with Retina service  RTC sooner prn with good understanding

## 2023-05-08 ENCOUNTER — TELEPHONE (OUTPATIENT)
Dept: OPHTHALMOLOGY | Facility: CLINIC | Age: 78
End: 2023-05-08
Payer: MEDICARE

## 2023-05-08 DIAGNOSIS — H40.51X3 NEOVASCULAR GLAUCOMA OF RIGHT EYE, SEVERE STAGE: Primary | ICD-10-CM

## 2023-05-09 ENCOUNTER — TELEPHONE (OUTPATIENT)
Dept: OPHTHALMOLOGY | Facility: CLINIC | Age: 78
End: 2023-05-09
Payer: MEDICARE

## 2023-05-10 ENCOUNTER — HOSPITAL ENCOUNTER (OUTPATIENT)
Facility: HOSPITAL | Age: 78
Discharge: HOME | End: 2023-05-10
Attending: OPHTHALMOLOGY | Admitting: OPHTHALMOLOGY
Payer: MEDICARE

## 2023-05-10 ENCOUNTER — ANESTHESIA (OUTPATIENT)
Dept: SURGERY | Facility: HOSPITAL | Age: 78
End: 2023-05-10
Payer: MEDICARE

## 2023-05-10 ENCOUNTER — ANESTHESIA EVENT (OUTPATIENT)
Dept: SURGERY | Facility: HOSPITAL | Age: 78
End: 2023-05-10
Payer: MEDICARE

## 2023-05-10 VITALS
WEIGHT: 135 LBS | HEART RATE: 67 BPM | SYSTOLIC BLOOD PRESSURE: 163 MMHG | TEMPERATURE: 98 F | DIASTOLIC BLOOD PRESSURE: 76 MMHG | HEIGHT: 66 IN | BODY MASS INDEX: 21.69 KG/M2 | RESPIRATION RATE: 22 BRPM | OXYGEN SATURATION: 99 %

## 2023-05-10 DIAGNOSIS — H40.51X3 NEOVASCULAR GLAUCOMA OF RIGHT EYE, SEVERE STAGE: Primary | ICD-10-CM

## 2023-05-10 DIAGNOSIS — H40.9 GLAUCOMA: ICD-10-CM

## 2023-05-10 DIAGNOSIS — H40.41X3 GLAUCOMA OF RIGHT EYE ASSOCIATED WITH OCULAR INFLAMMATION, SEVERE STAGE: ICD-10-CM

## 2023-05-10 LAB
POCT GLUCOSE: 165 MG/DL (ref 70–110)
POCT GLUCOSE: 218 MG/DL (ref 70–110)

## 2023-05-10 PROCEDURE — D9220A PRA ANESTHESIA: ICD-10-PCS | Mod: ANES,,, | Performed by: ANESTHESIOLOGY

## 2023-05-10 PROCEDURE — 66710 PR DESTRUC,CILIARY BODY,CYCLOPHOTOCOAG: ICD-10-PCS | Mod: RT,,, | Performed by: OPHTHALMOLOGY

## 2023-05-10 PROCEDURE — 71000044 HC DOSC ROUTINE RECOVERY FIRST HOUR: Performed by: OPHTHALMOLOGY

## 2023-05-10 PROCEDURE — D9220A PRA ANESTHESIA: ICD-10-PCS | Mod: CRNA,,, | Performed by: NURSE ANESTHETIST, CERTIFIED REGISTERED

## 2023-05-10 PROCEDURE — 36000706: Performed by: OPHTHALMOLOGY

## 2023-05-10 PROCEDURE — 71000016 HC POSTOP RECOV ADDL HR: Performed by: OPHTHALMOLOGY

## 2023-05-10 PROCEDURE — 71000015 HC POSTOP RECOV 1ST HR: Performed by: OPHTHALMOLOGY

## 2023-05-10 PROCEDURE — D9220A PRA ANESTHESIA: Mod: ANES,,, | Performed by: ANESTHESIOLOGY

## 2023-05-10 PROCEDURE — 36000707: Performed by: OPHTHALMOLOGY

## 2023-05-10 PROCEDURE — 37000009 HC ANESTHESIA EA ADD 15 MINS: Performed by: OPHTHALMOLOGY

## 2023-05-10 PROCEDURE — 63600175 PHARM REV CODE 636 W HCPCS: Performed by: ANESTHESIOLOGY

## 2023-05-10 PROCEDURE — 82962 GLUCOSE BLOOD TEST: CPT | Performed by: OPHTHALMOLOGY

## 2023-05-10 PROCEDURE — 66710 CILIARY TRANSSLERAL THERAPY: CPT | Mod: RT,,, | Performed by: OPHTHALMOLOGY

## 2023-05-10 PROCEDURE — 25000003 PHARM REV CODE 250: Performed by: OPHTHALMOLOGY

## 2023-05-10 PROCEDURE — D9220A PRA ANESTHESIA: Mod: CRNA,,, | Performed by: NURSE ANESTHETIST, CERTIFIED REGISTERED

## 2023-05-10 PROCEDURE — 25000003 PHARM REV CODE 250

## 2023-05-10 PROCEDURE — 82962 GLUCOSE BLOOD TEST: CPT | Mod: 91 | Performed by: OPHTHALMOLOGY

## 2023-05-10 PROCEDURE — 63600175 PHARM REV CODE 636 W HCPCS: Performed by: NURSE ANESTHETIST, CERTIFIED REGISTERED

## 2023-05-10 PROCEDURE — 25000003 PHARM REV CODE 250: Performed by: NURSE ANESTHETIST, CERTIFIED REGISTERED

## 2023-05-10 PROCEDURE — 37000008 HC ANESTHESIA 1ST 15 MINUTES: Performed by: OPHTHALMOLOGY

## 2023-05-10 RX ORDER — ONDANSETRON 2 MG/ML
4 INJECTION INTRAMUSCULAR; INTRAVENOUS DAILY PRN
Status: DISCONTINUED | OUTPATIENT
Start: 2023-05-10 | End: 2023-05-10 | Stop reason: HOSPADM

## 2023-05-10 RX ORDER — FENTANYL CITRATE 50 UG/ML
INJECTION, SOLUTION INTRAMUSCULAR; INTRAVENOUS
Status: DISCONTINUED | OUTPATIENT
Start: 2023-05-10 | End: 2023-05-10

## 2023-05-10 RX ORDER — PREDNISOLONE ACETATE 10 MG/ML
SUSPENSION/ DROPS OPHTHALMIC
Status: DISCONTINUED | OUTPATIENT
Start: 2023-05-10 | End: 2023-05-10 | Stop reason: HOSPADM

## 2023-05-10 RX ORDER — ACETAMINOPHEN 325 MG/1
650 TABLET ORAL EVERY 4 HOURS PRN
Status: DISCONTINUED | OUTPATIENT
Start: 2023-05-10 | End: 2023-05-10 | Stop reason: HOSPADM

## 2023-05-10 RX ORDER — TOBRAMYCIN AND DEXAMETHASONE 3; 1 MG/ML; MG/ML
SUSPENSION/ DROPS OPHTHALMIC
Status: DISCONTINUED | OUTPATIENT
Start: 2023-05-10 | End: 2023-05-10 | Stop reason: HOSPADM

## 2023-05-10 RX ORDER — ONDANSETRON 2 MG/ML
4 INJECTION INTRAMUSCULAR; INTRAVENOUS ONCE
Status: DISCONTINUED | OUTPATIENT
Start: 2023-05-10 | End: 2023-05-10 | Stop reason: HOSPADM

## 2023-05-10 RX ORDER — DEXMEDETOMIDINE HYDROCHLORIDE 100 UG/ML
INJECTION, SOLUTION INTRAVENOUS
Status: DISCONTINUED | OUTPATIENT
Start: 2023-05-10 | End: 2023-05-10

## 2023-05-10 RX ORDER — PREDNISOLONE ACETATE 10 MG/ML
SUSPENSION/ DROPS OPHTHALMIC
Status: DISCONTINUED
Start: 2023-05-10 | End: 2023-05-10 | Stop reason: HOSPADM

## 2023-05-10 RX ORDER — ATROPINE SULFATE 10 MG/ML
SOLUTION/ DROPS OPHTHALMIC
Status: DISCONTINUED
Start: 2023-05-10 | End: 2023-05-10 | Stop reason: WASHOUT

## 2023-05-10 RX ORDER — ATROPINE SULFATE 10 MG/ML
SOLUTION/ DROPS OPHTHALMIC
Status: DISCONTINUED
Start: 2023-05-10 | End: 2023-05-10 | Stop reason: HOSPADM

## 2023-05-10 RX ORDER — FENTANYL CITRATE 50 UG/ML
25 INJECTION, SOLUTION INTRAMUSCULAR; INTRAVENOUS EVERY 5 MIN PRN
Status: DISCONTINUED | OUTPATIENT
Start: 2023-05-10 | End: 2023-05-10 | Stop reason: HOSPADM

## 2023-05-10 RX ORDER — LIDOCAINE HYDROCHLORIDE 40 MG/ML
INJECTION, SOLUTION RETROBULBAR
Status: DISCONTINUED | OUTPATIENT
Start: 2023-05-10 | End: 2023-05-10 | Stop reason: HOSPADM

## 2023-05-10 RX ORDER — LIDOCAINE HYDROCHLORIDE 40 MG/ML
INJECTION, SOLUTION RETROBULBAR
Status: DISCONTINUED
Start: 2023-05-10 | End: 2023-05-10 | Stop reason: HOSPADM

## 2023-05-10 RX ORDER — NEOMYCIN SULFATE, POLYMYXIN B SULFATE, AND DEXAMETHASONE 3.5; 10000; 1 MG/G; [USP'U]/G; MG/G
OINTMENT OPHTHALMIC
Status: DISCONTINUED
Start: 2023-05-10 | End: 2023-05-10 | Stop reason: WASHOUT

## 2023-05-10 RX ORDER — PROPOFOL 10 MG/ML
VIAL (ML) INTRAVENOUS
Status: DISCONTINUED | OUTPATIENT
Start: 2023-05-10 | End: 2023-05-10

## 2023-05-10 RX ORDER — SODIUM CHLORIDE 9 MG/ML
INJECTION, SOLUTION INTRAVENOUS CONTINUOUS
Status: DISCONTINUED | OUTPATIENT
Start: 2023-05-10 | End: 2023-05-10 | Stop reason: HOSPADM

## 2023-05-10 RX ADMIN — PROPOFOL 20 MG: 10 INJECTION, EMULSION INTRAVENOUS at 12:05

## 2023-05-10 RX ADMIN — ONDANSETRON 4 MG: 2 INJECTION INTRAMUSCULAR; INTRAVENOUS at 01:05

## 2023-05-10 RX ADMIN — FENTANYL CITRATE 25 MCG: 50 INJECTION, SOLUTION INTRAMUSCULAR; INTRAVENOUS at 12:05

## 2023-05-10 RX ADMIN — SODIUM CHLORIDE: 0.9 INJECTION, SOLUTION INTRAVENOUS at 12:05

## 2023-05-10 RX ADMIN — DEXMEDETOMIDINE HYDROCHLORIDE 8 MCG: 100 INJECTION, SOLUTION INTRAVENOUS at 12:05

## 2023-05-10 RX ADMIN — DEXMEDETOMIDINE HYDROCHLORIDE 4 MCG: 100 INJECTION, SOLUTION INTRAVENOUS at 12:05

## 2023-05-10 NOTE — DISCHARGE SUMMARY
[facility] - [facility] (1st Fl)  Discharge Note  Short Stay    Procedure(s) (LRB):  G-probe (Right)      OUTCOME: Patient tolerated treatment/procedure well without complication and is now ready for discharge.    DISPOSITION: Home or Self Care    FINAL DIAGNOSIS:  Advanced NVG OD    FOLLOWUP: In clinic    DISCHARGE INSTRUCTIONS:    Discharge Procedure Orders   Diet general        TIME SPENT ON DISCHARGE: 20 minutes

## 2023-05-10 NOTE — PRE-PROCEDURE INSTRUCTIONS
"PreOp Instructions given:   - Verbal medication information (what to hold and what to take)   - NPO guidelines   - Arrival place directions given; time to be given the day before procedure by the   Surgeon's Office   - Bathing with antibacterial soap   - Don't wear any jewelry or bring any valuables AM of surgery   - No makeup or moisturizer to face   - No perfume/cologne, powder, lotions or aftershave   Pt. verbalized understanding.   Pt reports "waking-up" during Cataract Sx and difficulty remaining in supine position.    Instructed on 15/15 Treatment of low BS< 70 mg/dl w/4 oz of clear sweetened beverage such as apple juice, Sprite or 7UP.  Recheck BS in 15 minutes - if BS remains < 70 mg/dl, drink an additional 4 oz of above listed clear sweetened beverage. Check BS in 15 minutes - if BS remains < 70 mg/dl use Glucagon and call 911.  Pt v.cPedrou      "

## 2023-05-10 NOTE — PLAN OF CARE
Dr Prater spoke to patient and her  at the bedside about meds, followup visit and home care.    Patient is feeling  alittle

## 2023-05-10 NOTE — PROGRESS NOTES
Notified dr mccracken of blood sugar 218. No further instruction. Home insulin pump in place, does not infuse unless patient programs it. Dr Mccracken aware. No further instruction.    tm.

## 2023-05-10 NOTE — H&P
History    Chief complaint:  Poorly controlled NVG glaucoma  right Eye    Present Ilness/Diagnosis: Poorly controlled NVG glaucoma right Eye    ROS: +Eyes, otherwise no significant changes    Past Medical History: refer to chart    Family History/Social History: refer to chart    Allergies:  Review of patient's allergies indicates:   Allergen Reactions    Alendronate Other (See Comments) and Tinitus     Jaw pain and deteriorations      Amiodarone Shortness Of Breath    Atorvastatin Other (See Comments)     Muscle pains      Diltiazem Shortness Of Breath and Other (See Comments)    Ibandronate Other (See Comments)     Osteoporosis( several broken bones)      Ibandronate sodium Other (See Comments)    Amlodipine-valsartan Other (See Comments)     Cough      Budesonide Other (See Comments)     Month raw and sore.      Cardizem [diltiazem hcl]      Chest pain heart attack     Ciprofloxacin     Ibuprofen Other (See Comments)     Nasal congestion      Irbesartan Other (See Comments)     cough      Losartan Other (See Comments)     Dizziness      Meloxicam Other (See Comments)     Chest pains      Metoprolol Other (See Comments)     Nasal congestion,cough  and blockage.      Nebivolol Other (See Comments)     Cough      Omeprazole Hives and Other (See Comments)    Pantoprazole Diarrhea and Other (See Comments)    Reserpine-hydrochlorothiazide Other (See Comments)     Urinary retention. Clouds over her eyes.    Sulfa (sulfonamide antibiotics) Other (See Comments)     Headache      Atenolol Other (See Comments) and Palpitations       Current Medications: see medcard      Physical Exam    BP: Vital signs stable  General: No apparent distress  HEENT: See clinic eye notes for details without significant interval change  Lungs: adequate respirations  Heart: + pulses  Abdomen: soft  Rectal/pelvic: deferred    Labs: Labs Reviewed    No results found for: WBC, HGB, HCT, MCV, PLT        CMP  No results found for: NA, K, CL, CO2,  GLU, BUN, CREATININE, CALCIUM, PROT, ALBUMIN, BILITOT, ALKPHOS, AST, ALT, ANIONGAP, EGFRNORACEVR      Impression:  See previous clinic notes for surgical indications  Poorly controlled glaucoma right Eye    Plan: Glaucoma surgery G-Probe [place] technique right Eye  Re-discussed at bedside with patient  @ Sx expectations, post-op course and Q & A

## 2023-05-10 NOTE — ANESTHESIA PREPROCEDURE EVALUATION
05/10/2023  Jayda Deleon is a 78 y.o., female.  Pre-operative evaluation for Procedure(s) (LRB):  G-probe (Right)    Jayda Deleon is a 78 y.o. female     Patient Active Problem List   Diagnosis    Ocular hypertension, bilateral    Diabetes mellitus due to underlying condition with both eyes affected by mild nonproliferative retinopathy without macular edema, with long-term current use of insulin    Pseudophakia of both eyes    Hx of detached retina repair    Epiretinal membrane, right eye    Neovascular glaucoma of right eye, severe stage    Ocular ischemic syndrome    Retinal detachment of left eye with multiple breaks    Posterior capsular opacification of left eye, obscuring vision    Acute iritis of right eye    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, severe stage    Impaired range of motion of cervical spine    Weakness of both upper extremities    Impaired mobility and activities of daily living    Blindness and low vision       Review of patient's allergies indicates:   Allergen Reactions    Alendronate Other (See Comments) and Tinitus     Jaw pain and deteriorations      Amiodarone Shortness Of Breath    Atorvastatin Other (See Comments)     Muscle pains      Diltiazem Shortness Of Breath and Other (See Comments)    Ibandronate Other (See Comments)     Osteoporosis( several broken bones)      Ibandronate sodium Other (See Comments)    Amlodipine-valsartan Other (See Comments)     Cough      Budesonide Other (See Comments)     Month raw and sore.      Cardizem [diltiazem hcl]      Chest pain heart attack     Ciprofloxacin     Ibuprofen Other (See Comments)     Nasal congestion      Irbesartan Other (See Comments)     cough      Losartan Other (See Comments)     Dizziness      Meloxicam Other (See Comments)     Chest pains       Metoprolol Other (See Comments)     Nasal congestion,cough  and blockage.      Nebivolol Other (See Comments)     Cough      Omeprazole Hives and Other (See Comments)    Pantoprazole Diarrhea and Other (See Comments)    Reserpine-hydrochlorothiazide Other (See Comments)     Urinary retention. Clouds over her eyes.    Sulfa (sulfonamide antibiotics) Other (See Comments)     Headache      Atenolol Other (See Comments) and Palpitations       No current facility-administered medications on file prior to encounter.     Current Outpatient Medications on File Prior to Encounter   Medication Sig Dispense Refill    amLODIPine (NORVASC) 5 MG tablet Take 5 mg by mouth 2 (two) times daily.      apixaban (ELIQUIS) 5 mg Tab Take 5 mg by mouth 2 (two) times daily.      ascorbic acid, vitamin C, (VITAMIN C) 1000 MG tablet Take 1,000 mg by mouth once daily.      brimonidine 0.2% (ALPHAGAN) 0.2 % Drop INSTILL 1 DROP INTO BOTH EYES  TWICE DAILY 15 mL 5    brinzolamide-brimonidine (SIMBRINZA) 1-0.2 % DrpS Place 1 drop into the right eye 2 (two) times a day. 8 mL 6    dorzolamide (TRUSOPT) 2 % ophthalmic solution Place 1 drop into both eyes 2 (two) times a day. 10 mL 6    dorzolamide-timolol 2-0.5% (COSOPT) 22.3-6.8 mg/mL ophthalmic solution 1 drop.      famotidine (PEPCID) 20 MG tablet Take 20 mg by mouth once daily.      HUMALOG U-100 INSULIN 100 unit/mL injection Inject into the skin.      insulin aspart U-100 (NOVOLOG) 100 unit/mL injection Novolog U-100 Insulin aspart 100 unit/mL subcutaneous solution   USE 1 VIAL PER WEEK FOR INSULIN PUMP      latanoprost 0.005 % ophthalmic solution Place 1 drop into both eyes every evening. 7.5 mL 4    qaicirsdx-O6-cdC97-algal oil (METANX/FOLTANX RF) 3 mg-35 mg-2 mg -90.314 mg Cap 1 tablet      lutein 20 mg Cap Take by mouth.      mecobal/levomefolat Ca/B6 phos (METANX ORAL)   Metanx Methylcobal/Levomefolate/Pyridoxal Phos 3/35/2 mg Tab, See Instructions, 180 Unknown unit, 0,  "0, Substitution Allowed, 90, Route to Pharmacy Electronically, [facility] [id number], Instructions Replace Required Details, sandip, 10/04...      multivitamin-min-iron-FA-vit K 45 mg iron- 800 mcg-120 mcg Cap as directed      folic acid (FOLVITE) 1 MG tablet Take 1,000 mcg by mouth.      HYDROcodone-acetaminophen (NORCO) 7.5-325 mg per tablet Take 1 tablet by mouth every 4 (four) hours as needed.      ONETOUCH ULTRA TEST Strp 3 (three) times daily.         No past surgical history on file.    Social History     Socioeconomic History    Marital status:    Tobacco Use    Smoking status: Never    Smokeless tobacco: Never               Pre-op Assessment    I have reviewed the Patient Summary Reports.     I have reviewed the Nursing Notes.    I have reviewed the Medications.     Review of Systems  Anesthesia Hx:  "wakes up during anesthesia" both during "twilight" and general.  Feels like she cant breath and sits up. History of prior surgery of interest to airway management or planning: Denies Family Hx of Anesthesia complications.   Denies Personal Hx of Anesthesia complications.   Social:  Non-Smoker    Hematology/Oncology:  Hematology Normal   Oncology Normal     EENT/Dental:EENT/Dental Normal   Cardiovascular:  Cardiovascular Normal     Pulmonary:  Pulmonary Normal    Renal/:  Renal/ Normal     Hepatic/GI:  Hepatic/GI Normal    Musculoskeletal:  Musculoskeletal Normal    Neurological:  Neurology Normal    Endocrine:   Diabetes, using insulin Brittle diabetic   Psych:  Psychiatric Normal           Physical Exam  General: Well nourished and Cooperative    Airway:  Mallampati: I   Mouth Opening: Normal  TM Distance: Normal  Tongue: Normal  Neck ROM: Normal ROM    Dental:  Intact    Chest/Lungs:  Clear to auscultation, Normal Respiratory Rate    Heart:  Rate: Normal  Rhythm: Regular Rhythm  Sounds: Normal        Anesthesia Plan  Type of Anesthesia, risks & benefits discussed:    Anesthesia " Type: Gen ETT, Gen Supraglottic Airway, Gen Natural Airway, MAC  Intra-op Monitoring Plan: Standard ASA Monitors  Post Op Pain Control Plan: multimodal analgesia  Induction:  IV  Airway Plan: , Post-Induction  Informed Consent: Informed consent signed with the Patient and all parties understand the risks and agree with anesthesia plan.  All questions answered.   ASA Score: 3  Day of Surgery Review of History & Physical: H&P Update referred to the surgeon/provider.    Ready For Surgery From Anesthesia Perspective.     .

## 2023-05-10 NOTE — OP NOTE
Operative Date:  05/10/2023    Discharge Date:  05/10/2023    Discharge Patient Home    SURGEON: Delroy Prater MD    ASSISTANT: Tray Ding MD    PREOPERATIVE DIAGNOSIS: Poorly controlled Advanced NVG glaucoma of the right eye    POSTOPERATIVE DIAGNOSIS: Poorly controlled Advanced NVG glaucoma of the right eye    PROCEDURE PERFORMED: G-Probe Laser Ciliary Body Destruction [facility] Treatment  to Superior Temporal / Inferior Temporal  / Inferior Nasal quadrants of the right eye    COMPLICATIONS: None.    ESTIMATED BLOOD LOSS: Minimal.    ANESTHESIA: Topical with MAC     PROCEDURE IN DETIAL: The patient is a 78 year old woman with poorly controlled glaucoma.  The intraocular pressure was deemed too high for the health the optic nerve and visual field status. Discussions have been carried out with this patient regarding the pertinent options, surgical risks and benefits of the procedure and expectations.   The patient / family voiced good understanding and wished to proceed with the above procedure.    The patient and correct eye to be operated on were identified by the operating surgeon and surgical team and the patient was brought into the operating room. The patient received topical anesthesia, a speculum was placed and Tobradex solution was applied to the eye surface.    The illuminated G-Probe handpiece was positioned on the eye in proper location and treatment was begun using 1250 mW and and adjusted downward to 1180 mW a total of 12 pulses were applied at 4000 msec duration through the quadrants of desired treatment.  Care was taken to skip the 3 & 9 o'clock locations. The anterior chamber was well formed throughout the case and there were no complications.  Following the procedure, a drop of atropine 1% along with Pred-Forte 1% drops and Tobradex ointment was placed on the cornea.  The eye was closed & shield placed.  The patient was taken to the recovery room in good and stable condition.  The  patient tolerated the procedure well.  The patient  was instructed to refrain from any heavy lifting, bending, stooping, or straining activities, discharged Home and to follow up  for routine postoperative care with Dr. Delroy Prater as instructed.

## 2023-05-10 NOTE — TRANSFER OF CARE
"Anesthesia Transfer of Care Note    Patient: Jayda Deleon    Procedure(s) Performed: Procedure(s) (LRB):  G-probe (Right)    Patient location: PACU    Anesthesia Type: general    Transport from OR: Transported from OR on room air with adequate spontaneous ventilation    Post pain: adequate analgesia    Post assessment: no apparent anesthetic complications and tolerated procedure well    Post vital signs: stable    Level of consciousness: awake and alert    Nausea/Vomiting: no nausea/vomiting    Complications: none    Transfer of care protocol was followed      Last vitals:   Visit Vitals  BP (!) 155/72 (BP Location: Left arm, Patient Position: Lying)   Pulse 64   Temp 36.7 °C (98.1 °F) (Temporal)   Resp 18   Ht 5' 6" (1.676 m)   Wt 61.2 kg (135 lb)   SpO2 96%   Breastfeeding No   BMI 21.79 kg/m²     "

## 2023-05-11 NOTE — ANESTHESIA POSTPROCEDURE EVALUATION
Anesthesia Post Evaluation    Patient: Jayda Deleon    Procedure(s) Performed: Procedure(s) (LRB):  G-probe (Right)    Final Anesthesia Type: general      Patient location during evaluation: PACU  Patient participation: Yes- Able to Participate  Level of consciousness: awake and alert  Post-procedure vital signs: reviewed and stable  Pain management: adequate  Airway patency: patent    PONV status at discharge: No PONV  Anesthetic complications: no      Cardiovascular status: blood pressure returned to baseline and hemodynamically stable  Respiratory status: unassisted and spontaneous ventilation  Hydration status: euvolemic  Follow-up not needed.          Vitals Value Taken Time   /76 05/10/23 1447   Temp 36.6 °C (97.8 °F) 05/10/23 1315   Pulse 67 05/10/23 1456   Resp 40 05/10/23 1456   SpO2 98 % 05/10/23 1456   Vitals shown include unvalidated device data.      No case tracking events are documented in the log.      Pain/Sia Score: Pain Rating Post Med Admin: 0 (5/10/2023  1:03 PM)  Sia Score: 10 (5/10/2023  2:45 PM)

## 2023-05-12 ENCOUNTER — TELEPHONE (OUTPATIENT)
Dept: OPHTHALMOLOGY | Facility: CLINIC | Age: 78
End: 2023-05-12
Payer: MEDICARE

## 2023-05-12 NOTE — TELEPHONE ENCOUNTER
Spoke to pt along with Dr. Prater   Advised pt how to use post op drops.   PF QID OD and Atropine QD OD   Continue all glaucoma drops.   Pt verbalized understanding.

## 2023-05-16 NOTE — PROGRESS NOTES
[facility]   Physical Therapy Treatment Note      Name: Jayda Pabon [facility]  Clinic Number: [id number]    Therapy Diagnosis:   Encounter Diagnoses   Name Primary?    Impaired range of motion of cervical spine Yes    Weakness of both upper extremities     Impaired mobility and activities of daily living      Physician: Kortney Rizo MD    Visit Date: 5/17/2023    Physician Orders: PT Eval and Treat   Medical Diagnosis from Referral: M54.12 (ICD-10-CM) - Cervical radiculopathy  Evaluation Date: 4/5/2023  Authorization Period Expiration: 3/31/25  Plan of Care Expiration: 7/5/23  Visit # / Visits authorized: 3/ 20   Progress Note Due: 6/16/23  FOTO: 1/ 1    PTA Visit #: 0/5     Time In: 2:00 p  Time Out: 3:00 p  Total Billable Time: 60 minutes    Subjective     Pt reports: she no longer wants to focus on her neck and only wants to work on her L UE strength and ROM. She reports she continues having difficulty with her balance and stability along with intermittent dizziness and BP issues. She reports her physicians are aware of this problem but have been unable to find/fix problem. She states she does not want to work on her balance in physical therapy, only her L UE.       She was compliant with home exercise program.  Response to previous treatment: good  Functional change: no significant improvement     Pain: 2/10  Location: bilateral hands- numbness      Objective      Range of Motion - MOVEMENT LOSS     ROM Loss 4/6 5/3   Flexion within functional limits WNL   Extension minimal loss stretch of B upper traps minimal   Side bending Right moderate loss p! On R Minimal    Side bending Left major loss p! On R Moderate    Rotation Right within functional limits p! On R  WNL   Rotation Left within functional limits p! On R WNL   Protraction within functional limits WNL   Retraction  major loss p! In neck Minimal        Treatment     Jayda received the treatments listed below:       therapeutic exercises to develop strength, endurance, and ROM for 30 minutes including:    +Wrist AROM- flexion, extension, rad/ulnar deviation   +AROM finger flexion, extension, abduction x1min each   +Ball squeeze  +Thumb opposition     NP  Cervical retraction against doorway 2x10  Doorway pec stretch x3nins  PROM to R UE [id number]   AAROM shoulder IR with elbow flexion x3mins  R UE D2 2x10  Scapular retraction 2x10  Scapular depression 2x10  Rows no resistance 2x10  Cross body B abduction 2x10    therapeutic activities to improve functional performance for 30 minutes, including:  -Assessed pt's BP and response to positional changes to determine if PT was indicated this date.   -Extensive education on safety concerns with vision, BP, and balance concerns and risk for falls. Discussed this therapist's recommendation to begin utilizing an assistive device to improve balance and decrease risk for injury  -Discussed complex medical presentation and barriers to progress with OP PT including co-morbidities, unknown etiology, and multisystem involvement     Patient Education and Home Exercises       Education provided:   - Reviewed HEP, fall prevention strategies, importance of following up with neurology and ophthalmology appts to address concerns    Written Home Exercises Provided: yes. Exercises were reviewed and Jayda was able to demonstrate them prior to the end of the session.  Jayda demonstrated good  understanding of the education provided. See EMR under Patient Instructions for exercises provided during therapy sessions    Assessment     Jayda Kruegerar Adrienne tolerated all exercises with intermittent increases in B UE pain present inconsistently, unrelated to specific mobility, activity, or position throughout tx. Patient's subjective reports of symptoms are not consistent with mechanical presentation and to follow no consistent pattern of reproduction or alleviation. This therapist believes pt may be  experiencing sx consistent with central sensitizations due to numerous psychosocial factors related to her complex medical history, 's health concerns, and family dynamic. This therapist is unsure whether tradition physical therapy intervention will assist with pt's goals at this time. This therapist is also very concerned with balance and mobility impairment along with fluctuations in blood pressure with associated dizziness. Because patient would like to only focus on L UE ROM and strength, activities modified this date and new HEP provided. Pt instructed to discontinue her previous HEP's at this time. Extensive education provided to education on fall prevention, new HEP, comorbidities, risk with fluctuating BP and also discussed this therapists recommendation to trial use of assistive device to improve balance and safety and with 4WW, given pt a place to rest as needed if she begins to feel poorly. Pt verbalized understanding.      Jayda Is progressing well towards her goals.     Pt prognosis is Fair.     Pt will continue to benefit from skilled outpatient physical therapy to address the deficits listed in the problem list box on initial evaluation, provide pt/family education and to maximize pt's level of independence in the home and community environment.     Pt's spiritual, cultural and educational needs considered and pt agreeable to plan of care and goals.     Anticipated barriers to physical therapy: chronicity of sx, presentation with significant comorbidities    Goals:   Short Term Goals: 4 weeks  1.Report decreased in pain at worse less than  <   / =  4  /10  to increase tolerance for functional activities. On going  2. Pt to improve range of motion cervical by 25% to allow for improved functional mobility to allow for improvement in IADLs.  On going  3. Increased  B UE MMT 1/2  grade to increase tolerance for ADL and work activities. On going  5. Pt to tolerate HEP to improve ROM and  independence with ADL's. On going     Long Term Goals: 8 weeks  1.Report decreased in pain at worse less than  <   / =  3  /10  to increase tolerance for functional activities. On going  2.Pt to improve range of motion by 75% to allow for improved functional mobility to allow for improvement in IADLs. On going  3.Increased B UE MMT  1 grade  to increase tolerance for ADL and work activities.On going  4.  Pt will report 37% on FOTO neck survey score for neck pain disability to demonstrate decrease in disability and improvement in neck pain.On going  5. Pt to be Independent with HEP to improve ROM and independence with ADL's. On going  6.. Pt to demonstrate ability to independently control and reduce their pain through posture positioning and mechanical movements throughout a typical day.  (approp and ongoing)  7. Pt will report ability to participate in ~10 minutes of gardening activities with decrease in reports pain and sx in order to improve quality of life. Pt goal on going     Plan   Plan of care Certification: 4/5/2023 to 7/5/2023.     Outpatient Physical Therapy 1 times weekly for 8 weeks to include the following interventions: Cervical/Lumbar Traction, Manual Therapy, Moist Heat/ Ice, Neuromuscular Re-ed, Patient Education, Therapeutic Activities, and Therapeutic Exercise. Dry needling     Sanjuanita Deal, PT   5/16/2023

## 2023-05-17 ENCOUNTER — CLINICAL SUPPORT (OUTPATIENT)
Dept: REHABILITATION | Facility: OTHER | Age: 78
End: 2023-05-17
Payer: MEDICARE

## 2023-05-17 DIAGNOSIS — Z74.09 IMPAIRED MOBILITY AND ACTIVITIES OF DAILY LIVING: ICD-10-CM

## 2023-05-17 DIAGNOSIS — M53.82 IMPAIRED RANGE OF MOTION OF CERVICAL SPINE: Primary | ICD-10-CM

## 2023-05-17 DIAGNOSIS — Z78.9 IMPAIRED MOBILITY AND ACTIVITIES OF DAILY LIVING: ICD-10-CM

## 2023-05-17 DIAGNOSIS — R29.898 WEAKNESS OF BOTH UPPER EXTREMITIES: ICD-10-CM

## 2023-05-17 PROCEDURE — 97530 THERAPEUTIC ACTIVITIES: CPT

## 2023-05-17 PROCEDURE — 97110 THERAPEUTIC EXERCISES: CPT

## 2023-05-18 ENCOUNTER — OFFICE VISIT (OUTPATIENT)
Dept: OPHTHALMOLOGY | Facility: CLINIC | Age: 78
End: 2023-05-18
Payer: MEDICARE

## 2023-05-18 DIAGNOSIS — H40.41X3 GLAUCOMA OF RIGHT EYE ASSOCIATED WITH OCULAR INFLAMMATION, SEVERE STAGE: ICD-10-CM

## 2023-05-18 DIAGNOSIS — Z96.1 PSEUDOPHAKIA OF BOTH EYES: ICD-10-CM

## 2023-05-18 DIAGNOSIS — H54.10 BLINDNESS AND LOW VISION: ICD-10-CM

## 2023-05-18 DIAGNOSIS — H40.51X3 NEOVASCULAR GLAUCOMA OF RIGHT EYE, SEVERE STAGE: Primary | ICD-10-CM

## 2023-05-18 DIAGNOSIS — H35.82 OCULAR ISCHEMIC SYNDROME: ICD-10-CM

## 2023-05-18 PROCEDURE — 99024 PR POST-OP FOLLOW-UP VISIT: ICD-10-PCS | Mod: POP,,, | Performed by: OPHTHALMOLOGY

## 2023-05-18 PROCEDURE — 99213 OFFICE O/P EST LOW 20 MIN: CPT | Mod: PBBFAC | Performed by: OPHTHALMOLOGY

## 2023-05-18 PROCEDURE — 99024 POSTOP FOLLOW-UP VISIT: CPT | Mod: POP,,, | Performed by: OPHTHALMOLOGY

## 2023-05-18 PROCEDURE — 99999 PR PBB SHADOW E&M-EST. PATIENT-LVL III: ICD-10-PCS | Mod: PBBFAC,,, | Performed by: OPHTHALMOLOGY

## 2023-05-18 PROCEDURE — 99999 PR PBB SHADOW E&M-EST. PATIENT-LVL III: CPT | Mod: PBBFAC,,, | Performed by: OPHTHALMOLOGY

## 2023-05-18 NOTE — PROGRESS NOTES
HPI    DLS: 05/05/2023 Dr. Prater    NVG   PC IOL OU     Patient states she has some achy pain OD 2-3/10 intermittently BP drops   when standing.     Simbrinza TID OD  Latanoprost HS OD   FML BID OD  Erythromycin debbi QHS OD   Last edited by Grazyna Burroughs on 5/18/2023 10:27 AM.            Assessment /Plan     For exam results, see Encounter Report.    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    Pre-Tx 34 // 20  07/26/2021    ==> OIS OD --> re-discussed loss of vision  Presents 12/29/2021 --> 43 // 18  + NVG // + NVA OD  + Iritis   + IVFA delayed fill with NVE  ==> Sees outside cardiology for A-Fib    Carotis US 01/05/2022  Right Mild plaque  Left small Plaque      Uveitic Glc OD  ==> Fine Iritis OD & IOP 29 OD 02/14/2023  Re-Discussed  Neg ROS      CCT  543 // 565    <24 -->  achieved --> G-Probe OD 05/10/2023    In Past  Patient re-declines Sx options 2/2 A-Fib issues and anticoag status 03/24/2023 & 05/05/2023    Right eye  -> tolerating well --> MTMT  --> Adjust  Xal q day  Alphagan BIB  Dorz BID   Simbrinza --> not using 2/2 cost / lack of coverage  FML BID --> QID x 1 week --> Rebound Iritis after G-Probe  PF 1% --> holding --> consider  A 1% q day --> off    On BB --> Does not tolerate    Right Eye  G-Probe 05/10/2023      H/o Pseudophakia OU  OD with Dr. Castle in 2007   OS with Dr. Huynh in 2014 --> Sulcus 3 piece  Open Cap OU --> Partial VS PCO OS through visual axis --> discussed  options    SP YAG Cap OS touch-up 10/11/2022   Doing well    Left eye  H/o RD OS  SP  SB/Cryo repair in 1980 --> Dr Payton    Right eye  SP ERM Peel // PPVx OD Huynh  Stable      NIDDM   Right eye + PDR c NVG  12/29/2021  - poorly controlled A1c for many years. ~9  Very difficult control 2/2 to gastroparesis  Diagnosed at age 36  + IO IRIS c AC Tap per retina service    RD precautions:  Discussed symptoms of RD with increased flashes, floaters, decreasing vision.  Patient/Family to call and return immediately to clinic should the symptoms of RD occur. Voiced good understanding with Q & A.      Right eye 11/08/2022        Left eye 11/08/2022        OIS  Right eye NVA  12/29/2021          Right eye G-Probe  Slow Burn [place] [employer]  [id number] mW for 4000 msec  4-6/ quadrant  No pops      Plan  RTC 1 months IOP & Iritis check & OCT RNFL  RTC Keep fu with Retina service  RTC sooner prn with good understanding

## 2023-05-23 NOTE — PROGRESS NOTES
[facility]   Physical Therapy Treatment Note      Name: Jayda Pabon [facility]  Clinic Number: [id number]    Therapy Diagnosis:   Encounter Diagnoses   Name Primary?    Impaired range of motion of cervical spine Yes    Weakness of both upper extremities     Impaired mobility and activities of daily living      Physician: Kortney Rizo MD    Visit Date: 5/24/2023    Physician Orders: PT Eval and Treat   Medical Diagnosis from Referral: M54.12 (ICD-10-CM) - Cervical radiculopathy  Evaluation Date: 4/5/2023  Authorization Period Expiration: 3/31/25  Plan of Care Expiration: 7/5/23  Visit # / Visits authorized: 4/ 20   Progress Note Due: 6/16/23  FOTO: 1/ 3    PTA Visit #: 0/5     Time In: 11:05 p  Time Out: 12:00 p  Total Billable Time: 55 minutes    Subjective     Pt reports: she continues to have intermittent B hand pain with consistent numbness. She reports she been able to intermittent perform new B hand HEP. She reports she balance and feeling better this date.     She was moderately compliant with home exercise program.    Response to previous treatment: good  Functional change: no significant improvement     Pain: 2/10  Location: bilateral hands- numbness      Objective      Range of Motion - MOVEMENT LOSS     ROM Loss 4/6 5/3   Flexion within functional limits WNL   Extension minimal loss stretch of B upper traps minimal   Side bending Right moderate loss p! On R Minimal    Side bending Left major loss p! On R Moderate    Rotation Right within functional limits p! On R  WNL   Rotation Left within functional limits p! On R WNL   Protraction within functional limits WNL   Retraction  major loss p! In neck Minimal        Treatment     Jyada received the treatments listed below:      therapeutic exercises to develop strength, endurance, and ROM for 55 minutes including:    Wrist AROM- flexion, extension, rad/ulnar deviation x1min each B  AROM finger flexion, extension, abduction  x1min each B  Thumb opposition   +Seated icep curl w/ 1lb weight with emphasis on  2x10 B   -Neutral    -Hammer curl  +Finger abduction with rubber band  +Finger adduction squeezes   +Lumbrical  squeeze   +Finger tap isolations     NP  Cervical retraction against doorway 2x10  Doorway pec stretch x3 mins  PROM to R UE [id number]   AAROM shoulder IR with elbow flexion x3mins  R UE D2 2x10  Scapular retraction 2x10  Scapular depression 2x10  Rows no resistance 2x10  Cross body B abduction 2x10  Ball squeeze      therapeutic activities to improve functional performance for - minutes, including:    Moist heat applied to B hands x5mins      Patient Education and Home Exercises       Education provided:   - Reviewed HEP, fall prevention strategies, importance of following up with neurology and ophthalmology appts to address concerns    Written Home Exercises Provided: yes. Exercises were reviewed and Jayda was able to demonstrate them prior to the end of the session.  Jayda demonstrated good  understanding of the education provided. See EMR under Patient Instructions for exercises provided during therapy sessions    Assessment     Jayda Deleon tolerated all exercises well. With AROM radial deviation  on L hand, she reports increase pain along lateral aspect of wrist. With AROM and PROM L wrist flexion, she reported increase pain over proximal hand, near 2nd row of MCT. Activities this date focused on improved mobility of B hands and wrists with emphasis on , motor control, and motor planning. Pt continues to express desire to only focus on hand function, especially of L, and does not want to address balance, R shoulder, or cervical involvement. Discussed referral to hand therapy clinic that would be better equip for her chief complaint. She is open to the idea of discharging PT and transitioning to OT. Did not update goals at this time, will continue to assess going forward.      Jayda Is  progressing well towards her goals.     Pt prognosis is Fair.     Pt will continue to benefit from skilled outpatient physical therapy to address the deficits listed in the problem list box on initial evaluation, provide pt/family education and to maximize pt's level of independence in the home and community environment.     Pt's spiritual, cultural and educational needs considered and pt agreeable to plan of care and goals.     Anticipated barriers to physical therapy: chronicity of sx, presentation with significant comorbidities    Goals:   Short Term Goals: 4 weeks  1.Report decreased in pain at worse less than  <   / =  4  /10  to increase tolerance for functional activities. On going  2. Pt to improve range of motion cervical by 25% to allow for improved functional mobility to allow for improvement in IADLs.  On going  3. Increased  B UE MMT 1/2  grade to increase tolerance for ADL and work activities. On going  5. Pt to tolerate HEP to improve ROM and independence with ADL's. On going     Long Term Goals: 8 weeks  1.Report decreased in pain at worse less than  <   / =  3  /10  to increase tolerance for functional activities. On going  2.Pt to improve range of motion by 75% to allow for improved functional mobility to allow for improvement in IADLs. On going  3.Increased B UE MMT  1 grade  to increase tolerance for ADL and work activities.On going  4.  Pt will report 37% on FOTO neck survey score for neck pain disability to demonstrate decrease in disability and improvement in neck pain.On going  5. Pt to be Independent with HEP to improve ROM and independence with ADL's. On going  6.. Pt to demonstrate ability to independently control and reduce their pain through posture positioning and mechanical movements throughout a typical day.  (approp and ongoing)  7. Pt will report ability to participate in ~10 minutes of gardening activities with decrease in reports pain and sx in order to improve quality of life.  Pt goal on going     Plan   Plan of care Certification: 4/5/2023 to 7/5/2023.     Outpatient Physical Therapy 1 times weekly for 8 weeks to include the following interventions: Cervical/Lumbar Traction, Manual Therapy, Moist Heat/ Ice, Neuromuscular Re-ed, Patient Education, Therapeutic Activities, and Therapeutic Exercise. Caryl Deal, PT   5/23/2023

## 2023-05-24 ENCOUNTER — CLINICAL SUPPORT (OUTPATIENT)
Dept: REHABILITATION | Facility: OTHER | Age: 78
End: 2023-05-24
Payer: MEDICARE

## 2023-05-24 DIAGNOSIS — R29.898 WEAKNESS OF BOTH UPPER EXTREMITIES: ICD-10-CM

## 2023-05-24 DIAGNOSIS — Z78.9 IMPAIRED MOBILITY AND ACTIVITIES OF DAILY LIVING: ICD-10-CM

## 2023-05-24 DIAGNOSIS — Z74.09 IMPAIRED MOBILITY AND ACTIVITIES OF DAILY LIVING: ICD-10-CM

## 2023-05-24 DIAGNOSIS — M53.82 IMPAIRED RANGE OF MOTION OF CERVICAL SPINE: Primary | ICD-10-CM

## 2023-05-24 PROCEDURE — 97110 THERAPEUTIC EXERCISES: CPT

## 2023-05-31 ENCOUNTER — CLINICAL SUPPORT (OUTPATIENT)
Dept: REHABILITATION | Facility: OTHER | Age: 78
End: 2023-05-31
Payer: MEDICARE

## 2023-05-31 DIAGNOSIS — M53.82 IMPAIRED RANGE OF MOTION OF CERVICAL SPINE: Primary | ICD-10-CM

## 2023-05-31 DIAGNOSIS — Z78.9 IMPAIRED MOBILITY AND ACTIVITIES OF DAILY LIVING: ICD-10-CM

## 2023-05-31 DIAGNOSIS — Z74.09 IMPAIRED MOBILITY AND ACTIVITIES OF DAILY LIVING: ICD-10-CM

## 2023-05-31 DIAGNOSIS — R29.898 WEAKNESS OF BOTH UPPER EXTREMITIES: ICD-10-CM

## 2023-05-31 PROCEDURE — 97110 THERAPEUTIC EXERCISES: CPT | Mod: CQ

## 2023-05-31 NOTE — PROGRESS NOTES
"  [facility]   Physical Therapy Treatment Note      Name: Jayda Pabon [facility]  Clinic Number: [id number]    Therapy Diagnosis:   Encounter Diagnoses   Name Primary?    Impaired range of motion of cervical spine Yes    Weakness of both upper extremities     Impaired mobility and activities of daily living      Physician: Kortney Rizo MD    Visit Date: 5/31/2023    Physician Orders: PT Eval and Treat   Medical Diagnosis from Referral: M54.12 (ICD-10-CM) - Cervical radiculopathy  Evaluation Date: 4/5/2023  Authorization Period Expiration: 3/31/25  Plan of Care Expiration: 7/5/23  Visit # / Visits authorized: 1/ 20   Progress Note Due: 6/16/23  FOTO: 1/ 3    PTA Visit #: 1/5     Time In: 10:55 am  Time Out: 11:50 am  Total Billable Time: 55 minutes    Subjective     Pt states "today is not a good day". Pt reports she experienced significant increase in R hand pain and swelling over the weekend. She almost went to ER due to pain. She also noted that following hand exercises last couple visits caused increase in soreness that lasted over 2 days.    She was moderately compliant with home exercise program.    Response to previous treatment: increase muscular soreness lasting over 2 days  Functional change: no significant improvement     Pain: 4/10  Location: bilateral hands- numbness      Objective      Range of Motion - MOVEMENT LOSS     ROM Loss 4/6 5/3   Flexion within functional limits WNL   Extension minimal loss stretch of B upper traps minimal   Side bending Right moderate loss p! On R Minimal    Side bending Left major loss p! On R Moderate    Rotation Right within functional limits p! On R  WNL   Rotation Left within functional limits p! On R WNL   Protraction within functional limits WNL   Retraction  major loss p! In neck Minimal        Treatment     Jayda received the treatments listed below:      therapeutic exercises to develop strength, endurance, and ROM for 55 minutes " "including:    UBE 3 min forward/3 min bakward  Doorway pec stretch 3x20" hold  Cervical retraction against doorway 2x10  Scapular retraction 2x10  Scapular depression 2x10  Wrist AROM- flexion, extension, rad/ulnar deviation x1min each B  Seated Bicep curl w/ 1lb weight with emphasis on  2x10 B   -Neutral    -Hammer curl    NP  AROM finger flexion, extension, abduction x1min each B  Thumb opposition   Finger abduction with rubber band  Finger adduction squeezes   Lumbrical  squeeze   Finger tap isolations   PROM to R UE [id number]   AAROM shoulder IR with elbow flexion x3mins  R UE D2 2x10  Rows no resistance 2x10  Cross body B abduction 2x10  Ball squeeze      therapeutic activities to improve functional performance for - minutes, including:    Moist heat applied to B hands x0mins      Patient Education and Home Exercises       Education provided:   - Reviewed HEP, fall prevention strategies, importance of following up with neurology and ophthalmology appts to address concerns    Written Home Exercises Provided: yes. Exercises were reviewed and Jayda was able to demonstrate them prior to the end of the session.  Jayda demonstrated good  understanding of the education provided. See EMR under Patient Instructions for exercises provided during therapy sessions    Assessment     Jayda Deleon returned reporting increased bilateral hand/R UE pain and numbness. Stating she experienced significant increase in pain and swelling in R hand over the weekend, almost having to go to ER due to the pain.  Treatment attempted to address cervical postural component of symptoms with pt tolerated treatment fair with max verbal cues and demonstration to decrease UT activation and maintain proper form.  Possible transfer to OT discussed with pt again with  Jayda expressed she would to try OT going forward per PT's plan.      Jayda Is progressing well towards her goals.     Pt prognosis is Fair.     Pt will " continue to benefit from skilled outpatient physical therapy to address the deficits listed in the problem list box on initial evaluation, provide pt/family education and to maximize pt's level of independence in the home and community environment.     Pt's spiritual, cultural and educational needs considered and pt agreeable to plan of care and goals.     Anticipated barriers to physical therapy: chronicity of sx, presentation with significant comorbidities    Goals:   Short Term Goals: 4 weeks  1.Report decreased in pain at worse less than  <   / =  4  /10  to increase tolerance for functional activities. On going  2. Pt to improve range of motion cervical by 25% to allow for improved functional mobility to allow for improvement in IADLs.  On going  3. Increased  B UE MMT 1/2  grade to increase tolerance for ADL and work activities. On going  5. Pt to tolerate HEP to improve ROM and independence with ADL's. On going     Long Term Goals: 8 weeks  1.Report decreased in pain at worse less than  <   / =  3  /10  to increase tolerance for functional activities. On going  2.Pt to improve range of motion by 75% to allow for improved functional mobility to allow for improvement in IADLs. On going  3.Increased B UE MMT  1 grade  to increase tolerance for ADL and work activities.On going  4.  Pt will report 37% on FOTO neck survey score for neck pain disability to demonstrate decrease in disability and improvement in neck pain.On going  5. Pt to be Independent with HEP to improve ROM and independence with ADL's. On going  6.. Pt to demonstrate ability to independently control and reduce their pain through posture positioning and mechanical movements throughout a typical day.  (approp and ongoing)  7. Pt will report ability to participate in ~10 minutes of gardening activities with decrease in reports pain and sx in order to improve quality of life. Pt goal on going     Plan   Plan of care Certification: 4/5/2023 to  7/5/2023.     Outpatient Physical Therapy 1 times weekly for 8 weeks to include the following interventions: Cervical/Lumbar Traction, Manual Therapy, Moist Heat/ Ice, Neuromuscular Re-ed, Patient Education, Therapeutic Activities, and Therapeutic Exercise. Caryl Bermudez, PTA   5/31/2023

## 2023-06-07 ENCOUNTER — CLINICAL SUPPORT (OUTPATIENT)
Dept: REHABILITATION | Facility: OTHER | Age: 78
End: 2023-06-07
Payer: MEDICARE

## 2023-06-07 DIAGNOSIS — R29.898 WEAKNESS OF BOTH UPPER EXTREMITIES: ICD-10-CM

## 2023-06-07 DIAGNOSIS — M53.82 IMPAIRED RANGE OF MOTION OF CERVICAL SPINE: Primary | ICD-10-CM

## 2023-06-07 DIAGNOSIS — Z78.9 IMPAIRED MOBILITY AND ACTIVITIES OF DAILY LIVING: ICD-10-CM

## 2023-06-07 DIAGNOSIS — Z74.09 IMPAIRED MOBILITY AND ACTIVITIES OF DAILY LIVING: ICD-10-CM

## 2023-06-07 PROCEDURE — 97530 THERAPEUTIC ACTIVITIES: CPT

## 2023-06-07 NOTE — PATIENT INSTRUCTIONS
Use this checklist to minimize fall risk    Outside your home  [place] the edges of outdoor steps and any steps that are especially narrow or are higher or lower than the rest.  [place] outside stairs with a mixture of sand and paint for better traction. Keep outdoor walkways clear and well-lit.  Clear snow and ice from entrances and sidewalks.    Inside your home  Remove all extraneous clutter in your house.  Keep telephone and electrical cords out of pathways.  Tack rugs and glue vinyl julian so they lie flat. Remove or replace rugs or runners that tend to slip, or attach nonslip backing.  Ensure that carpets are firmly attached to the stairs.  Do not stand on a chair to reach things. Store frequently used objects where you can reach them easily.    Keep a well-lit home  Have a lamp or light switch that you can easily reach without getting out of bed.  Use night lights in the bedroom, bathroom and hallways.  Keep a flashlight handy.  Have light switches at both ends of stairs and halls. Install handrails on both sides of stairs.  Turn on the lights when you go into the house at night.    Bathroom tips  Add grab bars in shower, tub and toilet areas.  Use nonslip adhesive strips or a mat in shower or tub.  Consider sitting on a bench or stool in the shower.  Consider using an elevated toilet seat.    Use care walking  Use helping devices, such as canes, as directed by your healthcare provider.  Wear nonslip, low-heeled shoes or slippers that fit snugly. Avoid walking in stocking feet.    And dont forget...  Review medications with your doctor or pharmacist. Some drugs, including over-thecounter drugs, can make you drowsy, dizzy and unsteady.  Have your hearing and eyesight tested. Inner ear problems can affect balance. Vision problems make it difficult to see potential hazards.  Discuss safe amounts of alcohol intake with your physician.  Exercise regularly to improve muscle flexibility, strength, and balance. Talk  to your healthcare provider about exercise programs that are right for you.  If you feel dizzy or lightheaded, sit down or stay seated until your head clears. Stand up slowly to avoid unsteadiness.

## 2023-06-07 NOTE — PROGRESS NOTES
[facility]   Physical Therapy Discharge Note      Name: Jayda Pabon [facility]  Clinic Number: [id number]    Therapy Diagnosis:   Encounter Diagnoses   Name Primary?    Impaired range of motion of cervical spine Yes    Weakness of both upper extremities     Impaired mobility and activities of daily living        Physician: Kortney Rizo MD    Visit Date: 6/7/2023    Physician Orders: PT Eval and Treat   Medical Diagnosis from Referral: M54.12 (ICD-10-CM) - Cervical radiculopathy  Evaluation Date: 4/5/2023  Authorization Period Expiration: 3/31/25  Plan of Care Expiration: 7/5/23  Visit # / Visits authorized: 6/20   FOTO: 2/2    PTA Visit #: 0/5     Time In: 11:08 am  Time Out: 12:05 pm  Total Billable Time: 53 minutes    Subjective     Pt states she feels she is not making much progress in PT and continues to experiencing increases in B UE pain as a result. She reports she continues to struggle with her BP and states she feels she needs to focus this concern before addressing her neck symptoms or balance concerns.      She was moderately compliant with home exercise program.    Response to previous treatment: increase muscular soreness lasting over 2 days  Functional change: no significant improvement     Pain: 4/10  Location: bilateral hands- numbness      Objective      Range of Motion - MOVEMENT LOSS     ROM Loss 4/6 5/3 6/7/23   Flexion within functional limits WNL WNL   Extension minimal loss stretch of B upper traps minimal WNL   Side bending Right moderate loss p! On R Minimal  WNL   Side bending Left major loss p! On R Moderate  Minimal    Rotation Right within functional limits p! On R  WNL WNL   Rotation Left within functional limits p! On R WNL WNL   Protraction within functional limits WNL WNL   Retraction  major loss p! In neck Minimal  Minimal      FOTO  Discharge 43%    Treatment     Jayda received the treatments listed below:      therapeutic activities to improve  functional performance for 53 minutes, including:  -Hand therapist consulted on patient's presentation   -Discussed progress and this therapist concerns for fall risk and balance  -Pt provided with hand out for fall prevention in her home  -Dicussed this therapist concerns with cervical involvement and link to SOPHIE PONCE sx  -Message sent to referring MD about concerns as well as referral to hand therapy         Patient Education and Home Exercises       Education provided:   - Reviewed HEP, fall prevention strategies, importance of following up with neurology and ophthalmology appts to address concerns    Written Home Exercises Provided: yes. Exercises were reviewed and Jayda was able to demonstrate them prior to the end of the session.  Jayda demonstrated good  understanding of the education provided. See EMR under Patient Instructions for exercises provided during therapy sessions    Assessment     Jayda Deleon returned with no improvement in symptoms. She was assessed my CHT this date who agrees symptoms are consistent with nerve involvement more proximal to cervical spine. Based on limited progress so far along with numerous co morbidities and health present complexities, this therapist believe there is minimal outpatient PT can provide for patient. Patient continues to verbalize concerns with her BP and dizziness limiting her ability to perform neck intervention. At this time, this therapist believes best practice would be to refer patient back to neurologist to determine more clear diagnosis and plan for symptoms and BP concerns. A message was sent to referring MD with above information with request for hand therapy evaluation to see if any interventions can be provided there. Patient verbalized understanding and agrees to plan.     Pt prognosis is Fair.       Pt's spiritual, cultural and educational needs considered and pt agreeable to plan of care and goals.     Anticipated barriers to physical  therapy: chronicity of sx, presentation with significant comorbidities    Goals:   Short Term Goals: 4 weeks  1.Report decreased in pain at worse less than  <   / =  4  /10  to increase tolerance for functional activities. UNMET  2. Pt to improve range of motion cervical by 25% to allow for improved functional mobility to allow for improvement in IADLs.  MET  3. Increased  B UE MMT 1/2  grade to increase tolerance for ADL and work activities. UNMET  5. Pt to tolerate HEP to improve ROM and independence with ADL's. MET     Long Term Goals: 8 weeks  1.Report decreased in pain at worse less than  <   / =  3  /10  to increase tolerance for functional activities. UNMET  2.Pt to improve range of motion by 75% to allow for improved functional mobility to allow for improvement in IADLs. UNMET  3.Increased B UE MMT  1 grade  to increase tolerance for ADL and work activities. UNMET  4.  Pt will report 37% on FOTO neck survey score for neck pain disability to demonstrate decrease in disability and improvement in neck pain.UNMET  5. Pt to be Independent with HEP to improve ROM and independence with ADL's. UNMET  6.. Pt to demonstrate ability to independently control and reduce their pain through posture positioning and mechanical movements throughout a typical day. UNMET  7. Pt will report ability to participate in ~10 minutes of gardening activities with decrease in reports pain and sx in order to improve quality of life. UNMET    Plan   Discharging from outpatient PT on 6/7/23       Sanjuanita Deal, PT, DPT   6/7/2023

## 2023-06-08 DIAGNOSIS — M54.12 CERVICAL RADICULOPATHY: Primary | ICD-10-CM

## 2023-06-12 ENCOUNTER — TELEPHONE (OUTPATIENT)
Dept: NEUROLOGY | Facility: CLINIC | Age: 78
End: 2023-06-12
Payer: MEDICARE

## 2023-06-13 ENCOUNTER — TELEPHONE (OUTPATIENT)
Dept: NEUROLOGY | Facility: CLINIC | Age: 78
End: 2023-06-13
Payer: MEDICARE

## 2023-06-13 NOTE — TELEPHONE ENCOUNTER
----- Message from Holly Dilcia sent at 6/13/2023 10:34 AM CDT -----  Contact: RAFIA BAKER [[id number]]  Type: Call Back      Who called: OLIVIARAFIA DOWELL [[id number]]      What is the request in detail: Patient is requesting a call back. Pt would like to know if she can get an MRI in additional to the neck MRI. She states that she has bilateral hand pain.    Please advise.     Can the clinic reply by [facility]? No      Would the patient rather a call back or a response via [facility]? Call back       Best call back number: [phone] (home)       Additional Information:

## 2023-06-13 NOTE — TELEPHONE ENCOUNTER
Staff contacted patient whom reported she like to have an MRI of her hand as well and would like for them both to be scheduled at the same time. Staff informed patient a message will be sent to physician to place an order for her hand

## 2023-06-21 ENCOUNTER — HOSPITAL ENCOUNTER (OUTPATIENT)
Dept: RADIOLOGY | Facility: OTHER | Age: 78
Discharge: HOME | End: 2023-06-21
Attending: STUDENT IN AN ORGANIZED HEALTH CARE EDUCATION/TRAINING PROGRAM
Payer: MEDICARE

## 2023-06-21 ENCOUNTER — PATIENT MESSAGE (OUTPATIENT)
Dept: NEUROLOGY | Facility: CLINIC | Age: 78
End: 2023-06-21
Payer: MEDICARE

## 2023-06-21 DIAGNOSIS — M54.12 CERVICAL RADICULOPATHY: ICD-10-CM

## 2023-06-21 PROCEDURE — 72141 MRI NECK SPINE W/O DYE: CPT | Mod: 26,,, | Performed by: RADIOLOGY

## 2023-06-21 PROCEDURE — 72141 MRI CERVICAL SPINE WITHOUT CONTRAST: ICD-10-PCS | Mod: 26,,, | Performed by: RADIOLOGY

## 2023-06-21 PROCEDURE — 72141 MRI NECK SPINE W/O DYE: CPT | Mod: TC

## 2023-06-27 ENCOUNTER — OFFICE VISIT (OUTPATIENT)
Dept: OPHTHALMOLOGY | Facility: CLINIC | Age: 78
End: 2023-06-27
Payer: MEDICARE

## 2023-06-27 DIAGNOSIS — H40.51X3 NEOVASCULAR GLAUCOMA OF RIGHT EYE, SEVERE STAGE: ICD-10-CM

## 2023-06-27 DIAGNOSIS — H54.10 BLINDNESS AND LOW VISION: ICD-10-CM

## 2023-06-27 DIAGNOSIS — E08.3293 DIABETES MELLITUS DUE TO UNDERLYING CONDITION WITH BOTH EYES AFFECTED BY MILD NONPROLIFERATIVE RETINOPATHY WITHOUT MACULAR EDEMA, WITH LONG-TERM CURRENT USE OF INSULIN: ICD-10-CM

## 2023-06-27 DIAGNOSIS — Z79.4 DIABETES MELLITUS DUE TO UNDERLYING CONDITION WITH BOTH EYES AFFECTED BY MILD NONPROLIFERATIVE RETINOPATHY WITHOUT MACULAR EDEMA, WITH LONG-TERM CURRENT USE OF INSULIN: ICD-10-CM

## 2023-06-27 DIAGNOSIS — Z96.1 PSEUDOPHAKIA OF BOTH EYES: ICD-10-CM

## 2023-06-27 DIAGNOSIS — H40.053 OCULAR HYPERTENSION, BILATERAL: ICD-10-CM

## 2023-06-27 DIAGNOSIS — H40.41X3 GLAUCOMA OF RIGHT EYE ASSOCIATED WITH OCULAR INFLAMMATION, SEVERE STAGE: Primary | ICD-10-CM

## 2023-06-27 PROCEDURE — 99999 PR PBB SHADOW E&M-EST. PATIENT-LVL III: CPT | Mod: PBBFAC,,, | Performed by: OPHTHALMOLOGY

## 2023-06-27 PROCEDURE — 92133 CPTRZD OPH DX IMG PST SGM ON: CPT | Mod: PBBFAC | Performed by: OPHTHALMOLOGY

## 2023-06-27 PROCEDURE — 99213 OFFICE O/P EST LOW 20 MIN: CPT | Mod: PBBFAC | Performed by: OPHTHALMOLOGY

## 2023-06-27 PROCEDURE — 99024 POSTOP FOLLOW-UP VISIT: CPT | Mod: S$PBB,,, | Performed by: OPHTHALMOLOGY

## 2023-06-27 PROCEDURE — 92133 POSTERIOR SEGMENT OCT OPTIC NERVE(OCULAR COHERENCE TOMOGRAPHY) - OU - BOTH EYES: ICD-10-PCS | Mod: 26,S$PBB,, | Performed by: OPHTHALMOLOGY

## 2023-06-27 PROCEDURE — 99999 PR PBB SHADOW E&M-EST. PATIENT-LVL III: ICD-10-PCS | Mod: PBBFAC,,, | Performed by: OPHTHALMOLOGY

## 2023-06-27 PROCEDURE — 99024 PR POST-OP FOLLOW-UP VISIT: ICD-10-PCS | Mod: S$PBB,,, | Performed by: OPHTHALMOLOGY

## 2023-06-27 NOTE — PROGRESS NOTES
HPI    DLS: 5/18/2023    Pt here for 1 Month Check/OCT;  Pt states no eye pain but feels like eyes are not working together. Pt   states blood sugar was high over the weekend over 800.     Meds;  Latanoprost QHS OD  Alphagan BID OU  Dorzolamide BID OU        Last edited by Ely Philippe on 6/27/2023 11:10 AM.            Assessment /Plan     For exam results, see Encounter Report.    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, severe stage    Ocular hypertension, bilateral    Diabetes mellitus due to underlying condition with both eyes affected by mild nonproliferative retinopathy without macular edema, with long-term current use of insulin    Pseudophakia of both eyes    Neovascular glaucoma of right eye, severe stage  -     Posterior Segment OCT Optic Nerve- Both eyes    Blindness and low vision             started Dialysis @03/2023 --> feels better    A-Fib --> recurrent + Eloquis    + Orthostatic Hypotension    ==> Patient reports progression of VF loss OD  Discussed Ischemia and elevated IOP   Patient resistant to sx options with health issues & Elequis--> consider G-Probe laser    POAG High Righ risk OD > OS  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    Pre-Tx 34 // 20  07/26/2021    ==> OIS OD --> re-discussed loss of vision  Presents 12/29/2021 --> 43 // 18  + NVG // + NVA OD  + Iritis   + IVFA delayed fill with NVE  ==> Sees outside cardiology for A-Fib    Carotis US 01/05/2022  Right Mild plaque  Left small Plaque      Uveitic Glc OD  ==> Fine Iritis OD & IOP 29 OD 02/14/2023  Re-Discussed  Neg ROS      CCT  543 // 565    <24 -->  achieved --> G-Probe OD 05/10/2023    Right eye  -> tolerating well --> MTMT  --> Adjust  Xal q day  Alphagan BID --> Hold --> may be impacting Low BP  Dorz BID     FML BID --> q day    PF 1% --> holding --> consider  A 1% q day --> off    Simbrinza --> not using 2/2 cost / lack of coverage    On BB --> Does not tolerate    Right Eye  G-Probe 05/10/2023      H/o Pseudophakia OU  OD  with Dr. Castle in 2007   OS with Dr. Huynh in 2014 --> Sulcus 3 piece  Open Cap OU --> Partial VS PCO OS through visual axis --> discussed options    SP YAG Cap OS touch-up 10/11/2022   Doing well    Left eye  H/o RD OS  SP  SB/Cryo repair in 1980 --> Dr Payton    Right eye  SP ERM Peel // PPVx OD Amina  Stable      NIDDM   Right eye + PDR c NVG  12/29/2021  - poorly controlled A1c for many years. ~9  Very difficult control 2/2 to gastroparesis  Diagnosed at age 36  + IO IRIS c AC Tap per retina service    RD precautions:  Discussed symptoms of RD with increased flashes, floaters, decreasing vision.  Patient/Family to call and return immediately to clinic should the symptoms of RD occur. Voiced good understanding with Q & A.      Right eye 11/08/2022        Left eye 11/08/2022        OIS  Right eye NVA  12/29/2021          Right eye G-Probe  Slow Burn [place] Technique  6871-0602 mW for 4000 msec  4-6/ quadrant  No pops      Plan  RTC 1 months IOP & Iritis check & adherence --> off Alphagan BID OD and FML q day  RTC Keep fu with Retina service  RTC sooner prn with good understanding

## 2023-07-05 ENCOUNTER — OFFICE VISIT (OUTPATIENT)
Dept: NEUROLOGY | Facility: CLINIC | Age: 78
End: 2023-07-05
Payer: MEDICARE

## 2023-07-05 VITALS
WEIGHT: 134.94 LBS | HEART RATE: 83 BPM | DIASTOLIC BLOOD PRESSURE: 82 MMHG | SYSTOLIC BLOOD PRESSURE: 187 MMHG | BODY MASS INDEX: 21.69 KG/M2 | HEIGHT: 66 IN

## 2023-07-05 DIAGNOSIS — M48.02 CERVICAL STENOSIS OF SPINE: ICD-10-CM

## 2023-07-05 DIAGNOSIS — R29.898 WEAKNESS OF BOTH UPPER EXTREMITIES: ICD-10-CM

## 2023-07-05 DIAGNOSIS — M54.12 CERVICAL RADICULOPATHY: Primary | ICD-10-CM

## 2023-07-05 PROCEDURE — 99214 PR OFFICE/OUTPT VISIT, EST, LEVL IV, 30-39 MIN: ICD-10-PCS | Mod: S$PBB,,, | Performed by: STUDENT IN AN ORGANIZED HEALTH CARE EDUCATION/TRAINING PROGRAM

## 2023-07-05 PROCEDURE — 99214 OFFICE O/P EST MOD 30 MIN: CPT | Mod: PBBFAC | Performed by: STUDENT IN AN ORGANIZED HEALTH CARE EDUCATION/TRAINING PROGRAM

## 2023-07-05 PROCEDURE — 99999 PR PBB SHADOW E&M-EST. PATIENT-LVL IV: ICD-10-PCS | Mod: PBBFAC,,, | Performed by: STUDENT IN AN ORGANIZED HEALTH CARE EDUCATION/TRAINING PROGRAM

## 2023-07-05 PROCEDURE — 99999 PR PBB SHADOW E&M-EST. PATIENT-LVL IV: CPT | Mod: PBBFAC,,, | Performed by: STUDENT IN AN ORGANIZED HEALTH CARE EDUCATION/TRAINING PROGRAM

## 2023-07-05 PROCEDURE — 99214 OFFICE O/P EST MOD 30 MIN: CPT | Mod: S$PBB,,, | Performed by: STUDENT IN AN ORGANIZED HEALTH CARE EDUCATION/TRAINING PROGRAM

## 2023-07-05 NOTE — PROGRESS NOTES
Patient ID: [id number]    Chief Complaint/Reason for Consult:f/u on Cervical radiculopathy     Subjective:     HPI  Jayda Deleon is a 78 y.o. female with DM Type I, diastolic HF, and paroxysmal A.fib and cervical spondylosis. she is presenting today for f/u on upper extremity paresthesias. She is accompanied by her .    Interval history (7/5/2023):  Upper extremity weakness is unchanged, involving the whole hands bilaterally, partly related to CTS despite the surgical release. PT was not helpful and had to discontinue due to worsening of symptoms during exercises (especially getting dizzy when lying down). MRI of cervical spine showed advanced degenerative changes and canal stenosis.     Initial HPI (3/31/2023):  2000 - Broke her right shoulder, did not  sundergoy for it. She has been having numbness and radiating pain down to the 4th and 5th finger since.   2021 - after receiving Moderna vaccine, she developed A.Fib, was started metoprolol and eliquis. She was taken off of it after a while due to side effects and she remained symptom free until 2 months ago when she went into A. Fib again. She has gone back to the ER since and was started on amiodarone and eliquis.  2-3 months age, she developed similar numbness and shooting pain from neck to 4th and 5th finger, but on the left. She has been very physically active recently with home and garden improvement. She believes she is not weak but she can not do anything due to the pain.     She also mentions overall worsening gait with the last few years.    PCP is Dr. Jose Martin Winn    Review of Systems   Cardiovascular:  Positive for palpitations.   Musculoskeletal:  Positive for gait problem and neck pain.   Neurological:  Positive for dizziness, weakness and numbness.   All other systems reviewed and are negative.    Past Medical History:  Past Medical History:   Diagnosis Date    Diabetes mellitus     H/O factor V Leiden mutation     patient stated  factor V leiden deficiency    Hypertension     Paroxysmal atrial fibrillation        Allergies:  Review of patient's allergies indicates:   Allergen Reactions    Alendronate Other (See Comments) and Tinitus     Jaw pain and deteriorations      Amiodarone Shortness Of Breath    Atorvastatin Other (See Comments)     Muscle pains      Diltiazem Shortness Of Breath and Other (See Comments)    Ibandronate Other (See Comments)     Osteoporosis( several broken bones)      Ibandronate sodium Other (See Comments)    Amlodipine-valsartan Other (See Comments)     Cough      Budesonide Other (See Comments)     Month raw and sore.      Cardizem [diltiazem hcl]      Chest pain heart attack     Ciprofloxacin     Ibuprofen Other (See Comments)     Nasal congestion      Irbesartan Other (See Comments)     cough      Losartan Other (See Comments)     Dizziness      Meloxicam Other (See Comments)     Chest pains      Metoprolol Other (See Comments)     Nasal congestion,cough  and blockage.      Nebivolol Other (See Comments)     Cough      Omeprazole Hives and Other (See Comments)    Pantoprazole Diarrhea and Other (See Comments)    Reserpine-hydrochlorothiazide Other (See Comments)     Urinary retention. Clouds over her eyes.    Sulfa (sulfonamide antibiotics) Other (See Comments)     Headache      Atenolol Other (See Comments) and Palpitations     Pertinent Family History:  No family history on file.     Pertinent Social History:  Lives with .  Social History     Socioeconomic History    Marital status:    Tobacco Use    Smoking status: Never    Smokeless tobacco: Never   Substance and Sexual Activity    Alcohol use: Not Currently    Drug use: Not Currently     Medications:  Current Outpatient Medications   Medication Instructions    amLODIPine (NORVASC) 5 mg, Oral, 2 times daily    apixaban (ELIQUIS) 5 mg, Oral, 2 times daily    ascorbic acid (vitamin C) (VITAMIN C) 1,000 mg, Oral, Daily    brimonidine 0.2% (ALPHAGAN)  0.2 % Drop INSTILL 1 DROP INTO BOTH EYES  TWICE DAILY    brinzolamide-brimonidine (SIMBRINZA) 1-0.2 % DrpS 1 drop, Right Eye, 2 times daily    dorzolamide (TRUSOPT) 2 % ophthalmic solution 1 drop, Both Eyes, 2 times daily    dorzolamide-timolol 2-0.5% (COSOPT) 22.3-6.8 mg/mL ophthalmic solution 1 drop    famotidine (PEPCID) 20 mg, Oral, Daily    folic acid (FOLVITE) 1,000 mcg, Oral    HUMALOG U-100 INSULIN 100 unit/mL injection Subcutaneous    HYDROcodone-acetaminophen (NORCO) 7.5-325 mg per tablet 1 tablet, Oral, Every 4 hours PRN    insulin aspart U-100 (NOVOLOG) 100 unit/mL injection Novolog U-100 Insulin aspart 100 unit/mL subcutaneous solution   USE 1 VIAL PER WEEK FOR INSULIN PUMP    latanoprost 0.005 % ophthalmic solution 1 drop, Both Eyes, Nightly    [id number]-algal oil (METANX/FOLTANX RF) 3 mg-35 mg-2 mg -90.314 mg Cap 1 tablet    lutein 20 mg Cap Oral    mecobal/levomefolat Ca/B6 phos (METANX ORAL)   Metanx Methylcobal/Levomefolate/Pyridoxal Phos 3/35/2 mg Tab, See Instructions, 180 Unknown unit, 0, 0, Substitution Allowed, 90, Route to Pharmacy Electronically, Brand [facility] [employer] [facility], [facility]_ID-[id number], Instructions Replace Required Details, , 10/04...    multivitamin-min-iron-FA-vit K 45 mg iron- 800 mcg-120 mcg Cap as directed    ONETOUCH ULTRA TEST Strp 3 times daily      Objective:     Vitals:    07/05/23 1543   BP: (!) 187/82   Pulse: 83        General:  Well-appearing, well-nourished, NAD, cooperative    Neurologic Exam:   Awake, alert and oriented x3  Speech spontaneous and fluent, intact comprehension.   Adequate fund of knowledge, vocabulary.    CN II - CN XII:  PERRLA. EOM intact. No Nystagmus. No ophthalmoplegia.   Facial sensation is normal to light touch.   Facial expression is full and symmetric.   Hearing is intact bilaterally.   Palate elevates symmetrically.   SCM and Trapezius full strength bilaterally.   Tongue is midline.     Motor:  Bilateral thenar atrophy. No tremor.      Shoulder  Abd Shoulder Add Elbow   Flex Elbow  Ext Wrist   Flex Wrist  Ext Finger  Flex Finger  Ext Finger  Abd Finger   Add IO Opposition   Right 4+ 4+ 4+ 4+ 4+ 4+   4 4 4 3   Left 4+ 4+ 4+ 4+ 4+ 4+   4 4 4 3      Hip  Flex Hip  Ext Thigh   Abd Thigh  Add Knee  Flex Knee  Ext Plantar  Flex Dorsiflex   Right 5 5   5 5 5 5   Left 5 5   5 5 5 5     Sensory:  Light touch: intact throughout.  Vibration:decreased at ankles   Proprioception: position intact in BUE    DTRs:   Biceps Brachioradialis Triceps Su Patellar Ankle Plantar   Right 2+ 2+   2+ 2+    Left 2+ 2+   2+ 2+      Coordination:  Finger to nose is slowed bilaterally.  impaired fine finger movements and rapid alternating movements on BUE.    Gait:  Wide based, tottering gait      Pertinent lab results  Lab Results   Component Value Date    EUYCZNYE74 >2000 (H) 03/31/2023    ZINC 74 03/31/2023    COPPER 1592 03/31/2023     Lab Results   Component Value Date    ARSENICBLD <1 03/31/2023    LEADBLOOD 1.2 03/31/2023    CADMIUM <0.2 03/31/2023    MERCURYBLOOD <1 03/31/2023     Lab Results   Component Value Date    PATHINTPSPE REVIEWED 03/31/2023    PATHINTPSIF REVIEWED 03/31/2023     Pertinent imaging results  *Images personally reviewed and interpreted:    Results for orders placed or performed during the hospital encounter of 06/21/23 (from the past 2160 hour(s))   MRI Cervical Spine Without Contrast    Impression    1. Motion compromised examination.  2. Details of degenerative spinal canal and foraminal narrowing, as described in the body of report.  3. No convincing cord signal abnormality.      Electronically signed by: Pedro Grey  Date:    06/21/2023  Time:    11:54     Other pertinent studies  None    Assessment & Plan:   Jayda Deleon is a 78 y.o. female with multiple co morbidities including Hx of bilateral CTS s/p surgical release who had initially presented with neck pain, radicular pain, numbness, and weakness in BUE (L>R) that was  worsened by physical exertion. Symptoms are partly secondary to residual nerve damage from CTS (as evident by bilateral thenar atrophy), however, the whole hand is involved on both sides (rather than median N. Distribution) which is secondary to the cervical spondylosis, canal stenosis, multilevel foraminal narrowing. She has failed PT therefore I'd recommend consultation with neurosurgery for further management, she is not interested in surgical options at this point but we discussed that this evaluation will be useful in determining how much of symptoms are related the cervical spondylosis and if there is any urgency in intervention to preserve upper extremity sensation and function. She expresses understanding and agrees with the plan.    1. Cervical radiculopathy    2. Cervical stenosis of spine    3. Weakness of both upper extremities        Plan was discussed in detail with the patient, who is in agreement.    Time spent on this encounter: 31 minutes. This includes face to face time (obtaining history, documenting clinical information in the EMR, physical exam, discussing the plan with patient) and non-face to face time (such as preparing to see the patient (ie. Chart review, reviewing and interpreting previous labs and imaging), further EMR documentation, ordering tests, independently interpreting results and communicating results to the patient/family/caregiver, or care coordinator).         Kortney Rizo MD    [facility]  07/05/2023

## 2023-08-02 DIAGNOSIS — H40.053 OCULAR HYPERTENSION, BILATERAL: ICD-10-CM

## 2023-08-02 DIAGNOSIS — H40.51X2 NEOVASCULAR GLAUCOMA, RIGHT, MODERATE STAGE: ICD-10-CM

## 2023-08-02 RX ORDER — DORZOLAMIDE HCL 20 MG/ML
1 SOLUTION/ DROPS OPHTHALMIC 2 TIMES DAILY
Qty: 10 ML | Refills: 12 | Status: SHIPPED | OUTPATIENT
Start: 2023-08-02

## 2023-08-08 ENCOUNTER — OFFICE VISIT (OUTPATIENT)
Dept: OPHTHALMOLOGY | Facility: CLINIC | Age: 78
End: 2023-08-08
Payer: MEDICARE

## 2023-08-08 DIAGNOSIS — H40.053 OCULAR HYPERTENSION, BILATERAL: ICD-10-CM

## 2023-08-08 DIAGNOSIS — H54.10 BLINDNESS AND LOW VISION: ICD-10-CM

## 2023-08-08 DIAGNOSIS — H40.41X3 GLAUCOMA OF RIGHT EYE ASSOCIATED WITH OCULAR INFLAMMATION, SEVERE STAGE: Primary | ICD-10-CM

## 2023-08-08 DIAGNOSIS — Z98.890 HX OF DETACHED RETINA REPAIR: ICD-10-CM

## 2023-08-08 DIAGNOSIS — H40.51X3 NEOVASCULAR GLAUCOMA OF RIGHT EYE, SEVERE STAGE: ICD-10-CM

## 2023-08-08 DIAGNOSIS — Z86.69 HX OF DETACHED RETINA REPAIR: ICD-10-CM

## 2023-08-08 DIAGNOSIS — H33.022 RETINAL DETACHMENT OF LEFT EYE WITH MULTIPLE BREAKS: ICD-10-CM

## 2023-08-08 DIAGNOSIS — Z96.1 PSEUDOPHAKIA OF BOTH EYES: ICD-10-CM

## 2023-08-08 PROCEDURE — 92020 GONIOSCOPY: CPT | Mod: S$PBB,,, | Performed by: OPHTHALMOLOGY

## 2023-08-08 PROCEDURE — 99024 POSTOP FOLLOW-UP VISIT: CPT | Mod: S$PBB,,, | Performed by: OPHTHALMOLOGY

## 2023-08-08 PROCEDURE — 99212 OFFICE O/P EST SF 10 MIN: CPT | Mod: PBBFAC | Performed by: OPHTHALMOLOGY

## 2023-08-08 PROCEDURE — 99999 PR PBB SHADOW E&M-EST. PATIENT-LVL II: CPT | Mod: PBBFAC,,, | Performed by: OPHTHALMOLOGY

## 2023-08-08 PROCEDURE — 92020 GONIOSCOPY: CPT | Mod: PBBFAC | Performed by: OPHTHALMOLOGY

## 2023-08-08 PROCEDURE — 99024 PR POST-OP FOLLOW-UP VISIT: ICD-10-PCS | Mod: S$PBB,,, | Performed by: OPHTHALMOLOGY

## 2023-08-08 PROCEDURE — 99999 PR PBB SHADOW E&M-EST. PATIENT-LVL II: ICD-10-PCS | Mod: PBBFAC,,, | Performed by: OPHTHALMOLOGY

## 2023-08-08 PROCEDURE — 92020 PR SPECIAL EYE EVAL,GONIOSCOPY: ICD-10-PCS | Mod: S$PBB,,, | Performed by: OPHTHALMOLOGY

## 2023-08-08 NOTE — PROGRESS NOTES
HPI    DLS: 6/27/2023    Pt sts she feels like VA is not always balanced and sometimes blurry in   good eye OS      Meds;  Latanoprost QHS OD  Dorzolamide BID OD  FML QD OD        Last edited by Marielena Hopper on 8/8/2023  9:18 AM.            Assessment /Plan     For exam results, see Encounter Report.    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, severe stage    Blindness and low vision    Neovascular glaucoma of right eye, severe stage    Ocular hypertension, bilateral    Pseudophakia of both eyes    Retinal detachment of left eye with multiple breaks    Hx of detached retina repair             started Home Dialysis @03/2023 --> feels better    A-Fib --> recurrent + Eloquis    + Orthostatic Hypotension  ==> discussed dehydration // heat exposure    ==> Patient reports progression of VF loss OD  Discussed Ischemia and elevated IOP   Patient resistant to sx options with health issues & Elequis--> consider G-Probe laser    POAG High Righ risk OD > OS  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    Pre-Tx 34 // 20  07/26/2021    ==> OIS OD --> re-discussed loss of vision  Presents 12/29/2021 --> 43 // 18  + NVG // + NVA OD  + Iritis   + IVFA delayed fill with NVE  ==> Sees outside cardiology for A-Fib    Carotis US 01/05/2022  Right Mild plaque  Left small Plaque      Uveitic Glc OD  ==> Fine Iritis OD & IOP 29 OD 02/14/2023  Re-Discussed  Neg ROS      CCT  543 // 565    <24 -->  not achieved --> G-Probe OD 05/10/2023    Right eye  -> tolerating well --> MTMT  --> Adjust  Xal q day  Alphagan BID --> restart per patient discussion  --> may be impacting Low BP a concern  Dorz BID   FML q day    PF 1% --> holding --> consider  A 1% q day --> off    Simbrinza --> not using 2/2 cost / lack of coverage    On BB --> Does not tolerate    Right Eye  G-Probe 05/10/2023    ==> consider Ahmed FP-7 // GGD OD      H/o Pseudophakia OU  OD with Dr. Castle in 2007   OS with Dr. Huynh in 2014 --> Sulcus 3 piece  Open Cap OU -->  Partial VS PCO OS through visual axis --> discussed options    SP YAG Cap OS touch-up 10/11/2022   Doing well    Left eye  H/o RD OS  SP  SB/Cryo repair in 1980 --> Dr Payton    Right eye  SP ERM Peel // PPVx OD Huynh  Stable      NIDDM   Right eye + PDR c NVG  12/29/2021  - poorly controlled A1c for many years. ~9  Very difficult control 2/2 to gastroparesis  Diagnosed at age 36  + IO IRIS c AC Tap per retina service    RD precautions:  Discussed symptoms of RD with increased flashes, floaters, decreasing vision.  Patient/Family to call and return immediately to clinic should the symptoms of RD occur. Voiced good understanding with Q & A.      Right eye 11/08/2022        Left eye 11/08/2022        OIS  Right eye NVA  12/29/2021          Right eye G-Probe  Slow Burn [place] Technique  1621-5939 mW for 4000 msec  4-6/ quadrant  No pops      Plan  RTC 2-4 weeks IOP & Iritis check & adherence --> restarting Alphagan BID OD  RTC Keep fu with Retina service  RTC sooner prn with good understanding

## 2023-08-14 ENCOUNTER — OFFICE VISIT (OUTPATIENT)
Dept: SPINE | Facility: CLINIC | Age: 78
End: 2023-08-14
Payer: MEDICARE

## 2023-08-14 DIAGNOSIS — M79.601 PAIN IN BOTH UPPER EXTREMITIES: Primary | ICD-10-CM

## 2023-08-14 DIAGNOSIS — M79.602 PAIN IN BOTH UPPER EXTREMITIES: Primary | ICD-10-CM

## 2023-08-14 DIAGNOSIS — M48.02 CERVICAL STENOSIS OF SPINE: ICD-10-CM

## 2023-08-14 DIAGNOSIS — M54.12 CERVICAL RADICULOPATHY: ICD-10-CM

## 2023-08-14 PROCEDURE — 99204 OFFICE O/P NEW MOD 45 MIN: CPT | Mod: S$PBB,,, | Performed by: NURSE PRACTITIONER

## 2023-08-14 PROCEDURE — 99999 PR PBB SHADOW E&M-EST. PATIENT-LVL III: ICD-10-PCS | Mod: PBBFAC,,, | Performed by: NURSE PRACTITIONER

## 2023-08-14 PROCEDURE — 99999 PR PBB SHADOW E&M-EST. PATIENT-LVL III: CPT | Mod: PBBFAC,,, | Performed by: NURSE PRACTITIONER

## 2023-08-14 PROCEDURE — 99213 OFFICE O/P EST LOW 20 MIN: CPT | Mod: PBBFAC | Performed by: NURSE PRACTITIONER

## 2023-08-14 PROCEDURE — 99204 PR OFFICE/OUTPT VISIT, NEW, LEVL IV, 45-59 MIN: ICD-10-PCS | Mod: S$PBB,,, | Performed by: NURSE PRACTITIONER

## 2023-08-14 NOTE — PROGRESS NOTES
Subjective:     Patient ID: Jayda Deleon is a 78 y.o. female.    Chief Complaint: No chief complaint on file.    HPI  Ms. Deleon is a 78-year-old female here for evaluation arm pain    Complaints of persistent pain in both arms  Denies any consistent neck pain  Did PT without relief, caused increased pain  History of type 1 diabetes with insulin pump, AFib on [facility]    Cervical MRI 2023    FINDINGS:  Comment: Examination compromised by motion.     Alignment: There is no significant vertebral subluxation.     Developmental spinal canal narrowing: None.     Discs: Degenerative disc disease, discussed further below.     Vertebrae: No definite acute abnormality.  Chronic appearing degenerative plate changes suggested.     Spinal cord: The imaged cord has normal signal.     Posterior fossa: No posterior fossa abnormalities are identified.     Level-wise findings are as follows:     C2-C3: There is no significant spinal canal or foraminal stenosis.     C3-C4: Disc osteophyte complex formation, uncinate hypertrophy, and facet arthropathy.  Overall mild-to-moderate central spinal canal stenosis.  No high-grade foraminal narrowing.     C4-C5: Disc osteophyte complex, uncinate hypertrophy, and facet arthropathy with ligamentum flavum thickening.  Overall moderate central spinal canal stenosis.  Suspect at least mild bilateral foraminal narrowing.     C5-C6: Disc osteophyte complex, uncinate hypertrophy, and facet arthropathy.  Overall moderate central spinal canal stenosis.  Suspected least mild right and mild-to-moderate left foraminal narrowing.     C6-C7: Disc osteophyte complex formation, uncinate hypertrophy, and facet arthropathy ligamentum flavum thickening.  Overall moderate central spinal canal stenosis.  Suspect at least moderate bilateral foraminal narrowing.     C7-T1: There is no significant spinal canal or foraminal stenosis.     Upper thoracic spine: Diffuse disc bulge at T2-3, C3-4, and T4-5,  mildly deforms ventral thecal sac.  Suspect mild central spinal canal stenosis at T2-3.     Additional comments:     Impression:     1. Motion compromised examination.  2. Details of degenerative spinal canal and foraminal narrowing, as described in the body of report.  3. No convincing cord signal abnormality.    Past Medical History:   Diagnosis Date    Diabetes mellitus     H/O factor V Leiden mutation     patient stated factor V leiden deficiency    Hypertension     Paroxysmal atrial fibrillation        Past Surgical History:   Procedure Laterality Date    CARPAL TUNNEL RELEASE      CATARACT EXTRACTION, BILATERAL Bilateral      SECTION      SCLERAL BUCKLE PROCEDURE          TRABECULECTOMY Right 5/10/2023    Procedure: G-probe;  Surgeon: Delroy Prater MD;  Location: [facility] OR [address] [place] [address];  Service: Ophthalmology;  Laterality: Right;  [id number]  12 applications    ULNAR NERVE REPAIR         No family history on file.    Social History     Socioeconomic History    Marital status:    Tobacco Use    Smoking status: Never    Smokeless tobacco: Never   Substance and Sexual Activity    Alcohol use: Not Currently    Drug use: Not Currently       Current Outpatient Medications   Medication Sig Dispense Refill    amLODIPine (NORVASC) 5 MG tablet Take 5 mg by mouth 2 (two) times daily.      apixaban (ELIQUIS) 5 mg Tab Take 5 mg by mouth 2 (two) times daily.      ascorbic acid, vitamin C, (VITAMIN C) 1000 MG tablet Take 1,000 mg by mouth once daily.      brimonidine 0.2% (ALPHAGAN) 0.2 % Drop INSTILL 1 DROP INTO BOTH EYES  TWICE DAILY 15 mL 5    brinzolamide-brimonidine (SIMBRINZA) 1-0.2 % DrpS Place 1 drop into the right eye 2 (two) times a day. 8 mL 6    dorzolamide (TRUSOPT) 2 % ophthalmic solution INSTILL 1 DROP INTO BOTH EYES  TWICE DAILY 10 mL 12    dorzolamide-timolol 2-0.5% (COSOPT) 22.3-6.8 mg/mL ophthalmic solution 1 drop.      famotidine (PEPCID) 20 MG tablet Take 20 mg by mouth once daily.       folic acid (FOLVITE) 1 MG tablet Take 1,000 mcg by mouth.      HUMALOG U-100 INSULIN 100 unit/mL injection Inject into the skin.      HYDROcodone-acetaminophen (NORCO) 7.5-325 mg per tablet Take 1 tablet by mouth every 4 (four) hours as needed.      insulin aspart U-100 (NOVOLOG) 100 unit/mL injection Novolog U-100 Insulin aspart 100 unit/mL subcutaneous solution   USE 1 VIAL PER WEEK FOR INSULIN PUMP      latanoprost 0.005 % ophthalmic solution Place 1 drop into both eyes every evening. 7.5 mL 4    isampgnbp-[id number]-algal oil (METANX/FOLTANX RF) 3 mg-35 mg-2 mg -90.314 mg Cap 1 tablet      lutein 20 mg Cap Take by mouth.      mecobal/levomefolat Ca/B6 phos (METANX ORAL)   Metanx Methylcobal/Levomefolate/Pyridoxal Phos 3/35/2 mg Tab, See Instructions, 180 Unknown unit, 0, 0, Substitution Allowed, 90, Route to Pharmacy Electronically, [id number], Instructions Replace Required Details, , 10/04...      multivitamin-min-iron-FA-vit K 45 mg iron- 800 mcg-120 mcg Cap as directed      ONETOUCH ULTRA TEST Strp 3 (three) times daily.       No current facility-administered medications for this visit.       Review of patient's allergies indicates:   Allergen Reactions    Alendronate Other (See Comments) and Tinitus     Jaw pain and deteriorations      Amiodarone Shortness Of Breath    Atorvastatin Other (See Comments)     Muscle pains      Diltiazem Shortness Of Breath and Other (See Comments)    Ibandronate Other (See Comments)     Osteoporosis( several broken bones)      Ibandronate sodium Other (See Comments)    Amlodipine-valsartan Other (See Comments)     Cough      Budesonide Other (See Comments)     Month raw and sore.      Cardizem [diltiazem hcl]      Chest pain heart attack     Ciprofloxacin     Ibuprofen Other (See Comments)     Nasal congestion      Irbesartan Other (See Comments)     cough      Losartan Other (See Comments)     Dizziness      Meloxicam Other (See Comments)      Chest pains      Metoprolol Other (See Comments)     Nasal congestion,cough  and blockage.      Nebivolol Other (See Comments)     Cough      Omeprazole Hives and Other (See Comments)    Pantoprazole Diarrhea and Other (See Comments)    Reserpine-hydrochlorothiazide Other (See Comments)     Urinary retention. Clouds over her eyes.    Sulfa (sulfonamide antibiotics) Other (See Comments)     Headache      Atenolol Other (See Comments) and Palpitations         Review of Systems   Constitutional: Negative for fever.   Cardiovascular:  Negative for chest pain.   Respiratory:  Negative for shortness of breath.    Musculoskeletal:  Negative for neck pain.        Arm pain   Gastrointestinal:  Negative for abdominal pain, bowel incontinence, nausea and vomiting.   Genitourinary:  Negative for bladder incontinence.   Neurological:  Positive for numbness and paresthesias.        Objective:     General: Jayda is well-developed, well-nourished, appears stated age, in no acute distress, alert and oriented to time, place and person.     General    Vitals reviewed.  Constitutional: She is oriented to person, place, and time. She appears well-developed and well-nourished.   HENT:   Head: Atraumatic.   Nose: Nose normal.   Eyes: Conjunctivae are normal.   Cardiovascular:  Normal rate.            Pulmonary/Chest: Effort normal.   Neurological: She is alert and oriented to person, place, and time.   Psychiatric: She has a normal mood and affect. Her behavior is normal. Judgment and thought content normal.     General Musculoskeletal Exam   Gait: normal     Back (L-Spine & T-Spine) / Neck (C-Spine) Exam     Neck (C-Spine) Range of Motion   Flexion:      Limited  Extension:  Limited  Right Lateral Bend: abnormal  Left Lateral Bend: abnormal  Right Rotation: abnormal  Left Rotation: abnormal    Spinal Sensation   Right Side Sensation  C-Spine Level: normal   Left Side Sensation  C-Spine Level: normal    Other   She has no scoliosis  .  Spinal Kyphosis:  Absent      Muscle Strength   Right Upper Extremity   Biceps: 4/5   Deltoid:  4/5  Triceps:  4/5  Wrist extension: 4/5   Finger Extensors:  4/5  Left Upper Extremity  Biceps: 4/5   Deltoid:  4/5  Triceps:  4/5  Wrist extension: 4/5   Finger Extensors:  4/5  Right Lower Extremity   Hip Flexion: 5/5   Quadriceps:  5/5   Anterior tibial:  5/5   EHL:  5/5  Left Lower Extremity   Hip Flexion: 5/5   Quadriceps:  5/5   Anterior tibial:  5/5   EHL:  5/5    Reflexes     Left Side  Biceps:  2+  Brachioradialis:  2+    Right Side   Biceps:  2+  Brachioradialis:  2+    Vascular Exam     Right Pulses        Carotid:                  2+    Left Pulses        Carotid:                  2+          Assessment:     1. Pain in both upper extremities    2. Cervical radiculopathy    3. Cervical stenosis of spine         Plan:        Prior records reviewed today  We discussed arm pain and the nature of arm pain.  We discussed that it is not one thing that causes the pain but an accumulation of multiple things that we do.    Discussed option of injections, but she would like to hold off, notes she does not tolerate steroids.   Discussed trying Healthy Back, but she states PT made it worse in the past.   We discussed posture sitting and the importance of trying to sit better.    We discussed the benefits of therapy and exercise and continuing to move.   Will consider Healthy Back and followup as needed    More than 50% of the total time  of 45 minutes was spent face to face in counseling on diagnosis and treatment options. I also counseled patient  on common and most usual side effect of prescribed medications.  I reviewed Primary care , and other specialty's notes to better coordinate patient's care. All questions were answered, and patient voiced understanding.      Follow-up: No follow-ups on file. If there are any questions prior to this, the patient was instructed to contact the office.

## 2023-08-20 ENCOUNTER — NURSE TRIAGE (OUTPATIENT)
Dept: ADMINISTRATIVE | Facility: CLINIC | Age: 78
End: 2023-08-20
Payer: MEDICARE

## 2023-08-20 ENCOUNTER — OFFICE VISIT (OUTPATIENT)
Dept: URGENT CARE | Facility: CLINIC | Age: 78
End: 2023-08-20
Payer: MEDICARE

## 2023-08-20 VITALS
WEIGHT: 134 LBS | OXYGEN SATURATION: 96 % | RESPIRATION RATE: 17 BRPM | BODY MASS INDEX: 21.53 KG/M2 | TEMPERATURE: 98 F | SYSTOLIC BLOOD PRESSURE: 143 MMHG | HEART RATE: 81 BPM | HEIGHT: 66 IN | DIASTOLIC BLOOD PRESSURE: 64 MMHG

## 2023-08-20 DIAGNOSIS — H53.8 BLURRY VISION, LEFT EYE: Primary | ICD-10-CM

## 2023-08-20 PROCEDURE — 99212 PR OFFICE/OUTPT VISIT, EST, LEVL II, 10-19 MIN: ICD-10-PCS | Mod: S$GLB,,,

## 2023-08-20 PROCEDURE — 99212 OFFICE O/P EST SF 10 MIN: CPT | Mod: S$GLB,,,

## 2023-08-20 RX ORDER — HYDROCODONE BITARTRATE AND ACETAMINOPHEN 5; 325 MG/1; MG/1
TABLET ORAL
COMMUNITY
End: 2023-12-06

## 2023-08-20 RX ORDER — AMLODIPINE BESYLATE 2.5 MG/1
TABLET ORAL
COMMUNITY
Start: 2023-06-14 | End: 2023-12-06

## 2023-08-20 RX ORDER — TOBRAMYCIN AND DEXAMETHASONE 3; 1 MG/ML; MG/ML
SUSPENSION/ DROPS OPHTHALMIC
COMMUNITY
End: 2023-12-06

## 2023-08-20 RX ORDER — VALSARTAN 40 MG/1
TABLET ORAL
COMMUNITY
End: 2023-12-06

## 2023-08-20 RX ORDER — FLUDROCORTISONE ACETATE 0.1 MG/1
TABLET ORAL
COMMUNITY
End: 2023-12-06

## 2023-08-20 RX ORDER — PROPAFENONE HYDROCHLORIDE 150 MG/1
150 TABLET, COATED ORAL 2 TIMES DAILY
COMMUNITY
Start: 2023-04-01

## 2023-08-20 RX ORDER — NEBIVOLOL 5 MG/1
TABLET ORAL
COMMUNITY
End: 2023-12-06

## 2023-08-20 RX ORDER — AMLODIPINE BESYLATE 5 MG/1
5 TABLET ORAL 2 TIMES DAILY
COMMUNITY
Start: 2023-08-15

## 2023-08-20 RX ORDER — MUPIROCIN 20 MG/G
OINTMENT TOPICAL
COMMUNITY
End: 2023-12-06

## 2023-08-20 RX ORDER — ICOSAPENT ETHYL 1000 MG/1
CAPSULE ORAL
COMMUNITY
End: 2023-12-06

## 2023-08-20 RX ORDER — FLUTICASONE PROPIONATE 50 MCG
SPRAY, SUSPENSION (ML) NASAL
COMMUNITY
End: 2023-12-06

## 2023-08-20 RX ORDER — OSELTAMIVIR PHOSPHATE 75 MG/1
CAPSULE ORAL
COMMUNITY
End: 2023-12-06

## 2023-08-20 RX ORDER — FLUOXETINE 10 MG/1
CAPSULE ORAL
COMMUNITY
End: 2023-12-06

## 2023-08-20 RX ORDER — AZITHROMYCIN 250 MG/1
TABLET, FILM COATED ORAL
COMMUNITY
End: 2023-12-06

## 2023-08-20 RX ORDER — NEBIVOLOL 2.5 MG/1
TABLET ORAL
COMMUNITY
End: 2023-12-06

## 2023-08-20 RX ORDER — CLINDAMYCIN HYDROCHLORIDE 300 MG/1
CAPSULE ORAL
COMMUNITY
End: 2023-12-06

## 2023-08-20 RX ORDER — PNEUMOCOCCAL 13-VALENT CONJUGATE VACCINE 2.2; 2.2; 2.2; 2.2; 2.2; 4.4; 2.2; 2.2; 2.2; 2.2; 2.2; 2.2; 2.2 UG/.5ML; UG/.5ML; UG/.5ML; UG/.5ML; UG/.5ML; UG/.5ML; UG/.5ML; UG/.5ML; UG/.5ML; UG/.5ML; UG/.5ML; UG/.5ML; UG/.5ML
INJECTION, SUSPENSION INTRAMUSCULAR
COMMUNITY
End: 2023-12-06

## 2023-08-20 RX ORDER — COVID-19 MOLECULAR TEST ASSAY
KIT MISCELLANEOUS
COMMUNITY
Start: 2023-05-02 | End: 2023-12-06

## 2023-08-20 RX ORDER — HYDROCHLOROTHIAZIDE 12.5 MG/1
CAPSULE ORAL
COMMUNITY
End: 2023-12-06

## 2023-08-20 RX ORDER — FAMOTIDINE 20 MG/1
1 TABLET, FILM COATED ORAL 2 TIMES DAILY
COMMUNITY
Start: 2023-05-15

## 2023-08-20 RX ORDER — IBUPROFEN 100 MG/5ML
SUSPENSION, ORAL (FINAL DOSE FORM) ORAL
COMMUNITY
End: 2023-12-06

## 2023-08-20 RX ORDER — ATORVASTATIN CALCIUM 20 MG/1
20 TABLET, FILM COATED ORAL DAILY
COMMUNITY

## 2023-08-20 RX ORDER — LIDOCAINE HYDROCHLORIDE 20 MG/ML
SOLUTION OROPHARYNGEAL
COMMUNITY
End: 2023-12-06

## 2023-08-20 RX ORDER — NEOMYCIN SULFATE, POLYMYXIN B SULFATE, AND DEXAMETHASONE 3.5; 10000; 1 MG/G; [USP'U]/G; MG/G
OINTMENT OPHTHALMIC
COMMUNITY
End: 2023-12-06

## 2023-08-20 RX ORDER — AMLODIPINE BESYLATE 2.5 MG/1
2.5 TABLET ORAL 2 TIMES DAILY
COMMUNITY

## 2023-08-20 RX ORDER — INDOMETHACIN 50 MG/1
CAPSULE ORAL
COMMUNITY
End: 2023-12-06

## 2023-08-20 RX ORDER — IRBESARTAN 300 MG/1
TABLET ORAL
COMMUNITY
End: 2023-12-06

## 2023-08-20 RX ORDER — TOBRAMYCIN 3 MG/ML
SOLUTION/ DROPS OPHTHALMIC
COMMUNITY
End: 2023-12-06

## 2023-08-20 RX ORDER — IRBESARTAN 150 MG/1
TABLET ORAL
COMMUNITY
End: 2023-12-06

## 2023-08-20 NOTE — PROGRESS NOTES
"Subjective:      Patient ID: Jayda Deleon is a 78 y.o. female.    Vitals:  height is 5' 6" (1.676 m) and weight is 60.8 kg (134 lb). Her oral temperature is 98.4 °F (36.9 °C). Her blood pressure is 143/64 (abnormal) and her pulse is 81. Her respiration is 17 and oxygen saturation is 96%.     Chief Complaint: Eye Problem    78-year-old female patient with history of glaucoma, blindness to the right eye reports seeing "black floaters" with "foggy vision" to the left eye (good eye) started today while she was eating lunch. Patient sees opthalmology and takes multiple prescribed eye drops. Patient denies eye pain, headache, weakness, or neurological changes. Patient reports still currently seeing floaters with "foggy vision."      Eye Problem   The left eye is affected. This is a new problem. The current episode started today. The problem occurs constantly. The problem has been unchanged. There was no injury mechanism. The pain is at a severity of 0/10. The patient is experiencing no pain. There is No known exposure to pink eye. She Does not wear contacts. Associated symptoms include blurred vision. Pertinent negatives include no eye discharge, double vision, eye redness, fever, foreign body sensation, itching, nausea, photophobia, recent URI, vomiting or weakness. She has tried nothing for the symptoms. The treatment provided no relief.       Constitution: Negative for activity change, appetite change, chills, sweating, fatigue, fever and unexpected weight change.   HENT:  Negative for ear pain, ear discharge, foreign body in ear and tinnitus.    Neck: Negative for neck pain, neck stiffness and painful lymph nodes.   Cardiovascular:  Negative for chest trauma, chest pain and leg swelling.   Eyes:  Positive for vision loss and blurred vision. Negative for eye trauma, foreign body in eye, eye discharge, eye itching, eye pain, eye redness, photophobia, double vision and eyelid swelling.   Respiratory:  Negative " for sleep apnea, chest tightness, cough and asthma.    Gastrointestinal:  Negative for abdominal trauma, abdominal pain, abdominal bloating, nausea and vomiting.   Endocrine: hair loss, cold intolerance and heat intolerance.   Genitourinary:  Negative for dysuria, frequency, urgency, urine decreased and flank pain.   Musculoskeletal:  Negative for pain, trauma, joint pain and joint swelling.   Skin:  Negative for color change, pale, rash, wound, abrasion and laceration.   Allergic/Immunologic: Negative for environmental allergies, seasonal allergies, food allergies, eczema, asthma and immunocompromised state.   Neurological:  Negative for dizziness, history of vertigo, light-headedness, passing out, facial drooping, speech difficulty, coordination disturbances, loss of balance, headaches, disorientation, altered mental status, loss of consciousness, numbness, tingling and seizures.   Hematologic/Lymphatic: Negative for swollen lymph nodes, easy bruising/bleeding and trouble clotting. Does not bruise/bleed easily.   Psychiatric/Behavioral:  Negative for altered mental status, disorientation, confusion, agitation, nervous/anxious, sleep disturbance and hallucinations. The patient is not nervous/anxious.       Objective:     Physical Exam   Constitutional: She is oriented to person, place, and time. She appears well-developed. She is cooperative.  Non-toxic appearance. She does not appear ill. No distress.   HENT:   Head: Normocephalic and atraumatic.   Ears:   Right Ear: Hearing and external ear normal.   Left Ear: Hearing and external ear normal.   Nose: Nose normal. No mucosal edema, rhinorrhea or nasal deformity. No epistaxis. Right sinus exhibits no maxillary sinus tenderness and no frontal sinus tenderness. Left sinus exhibits no maxillary sinus tenderness and no frontal sinus tenderness.   Mouth/Throat: Uvula is midline, oropharynx is clear and moist and mucous membranes are normal. No trismus in the jaw. Normal  dentition. No uvula swelling. No oropharyngeal exudate, posterior oropharyngeal edema or posterior oropharyngeal erythema.   Eyes: Conjunctivae and lids are normal. No scleral icterus.   Neck: Trachea normal and phonation normal. Neck supple. No edema present. No erythema present. No neck rigidity present.   Cardiovascular: Normal rate, regular rhythm, normal heart sounds and normal pulses.   Pulmonary/Chest: Effort normal and breath sounds normal. No respiratory distress. She has no decreased breath sounds. She has no rhonchi.   Abdominal: Normal appearance.   Musculoskeletal: Normal range of motion.         General: No deformity. Normal range of motion.   Neurological: She is alert and oriented to person, place, and time. She exhibits normal muscle tone. Coordination normal.   Skin: Skin is warm, dry, intact, not diaphoretic and not pale.   Psychiatric: Her speech is normal and behavior is normal. Judgment and thought content normal.   Nursing note and vitals reviewed.      Assessment:     1. Blurry vision, left eye      Vision Screening (Inadequate exam)    Right eye Left eye Both eyes   Without correction      With correction 0 20/50 20/50       Plan:       Blurry vision, left eye          Medical Decision Making:   Urgent Care Management:  Patient is concerned of pressure to her left eye. Spoke to patient and daughter we do not have ocular tonometry in clinic to test pressure of her eye and should go to ER immediately due to concerns of blindness. Patient and daughter verbalized understanding and walked out of room upset. They stated they will go to an ER.

## 2023-08-21 ENCOUNTER — OFFICE VISIT (OUTPATIENT)
Dept: OPHTHALMOLOGY | Facility: CLINIC | Age: 78
End: 2023-08-21
Payer: MEDICARE

## 2023-08-21 ENCOUNTER — TELEPHONE (OUTPATIENT)
Dept: OPHTHALMOLOGY | Facility: CLINIC | Age: 78
End: 2023-08-21
Payer: MEDICARE

## 2023-08-21 DIAGNOSIS — E08.3293 DIABETES MELLITUS DUE TO UNDERLYING CONDITION WITH BOTH EYES AFFECTED BY MILD NONPROLIFERATIVE RETINOPATHY WITHOUT MACULAR EDEMA, WITH LONG-TERM CURRENT USE OF INSULIN: ICD-10-CM

## 2023-08-21 DIAGNOSIS — H40.41X3 GLAUCOMA OF RIGHT EYE ASSOCIATED WITH OCULAR INFLAMMATION, SEVERE STAGE: ICD-10-CM

## 2023-08-21 DIAGNOSIS — H43.392 VITREOUS FLOATERS OF LEFT EYE: Primary | ICD-10-CM

## 2023-08-21 DIAGNOSIS — H54.10 BLINDNESS AND LOW VISION: ICD-10-CM

## 2023-08-21 DIAGNOSIS — Z98.890 HX OF DETACHED RETINA REPAIR: ICD-10-CM

## 2023-08-21 DIAGNOSIS — Z79.4 DIABETES MELLITUS DUE TO UNDERLYING CONDITION WITH BOTH EYES AFFECTED BY MILD NONPROLIFERATIVE RETINOPATHY WITHOUT MACULAR EDEMA, WITH LONG-TERM CURRENT USE OF INSULIN: ICD-10-CM

## 2023-08-21 DIAGNOSIS — Z96.1 PSEUDOPHAKIA OF BOTH EYES: ICD-10-CM

## 2023-08-21 DIAGNOSIS — H40.51X3 NEOVASCULAR GLAUCOMA OF RIGHT EYE, SEVERE STAGE: ICD-10-CM

## 2023-08-21 DIAGNOSIS — Z86.69 HX OF DETACHED RETINA REPAIR: ICD-10-CM

## 2023-08-21 PROCEDURE — 99214 OFFICE O/P EST MOD 30 MIN: CPT | Mod: S$PBB,,, | Performed by: OPHTHALMOLOGY

## 2023-08-21 PROCEDURE — 99214 OFFICE O/P EST MOD 30 MIN: CPT | Mod: PBBFAC,PO | Performed by: OPHTHALMOLOGY

## 2023-08-21 PROCEDURE — 99999 PR PBB SHADOW E&M-EST. PATIENT-LVL IV: ICD-10-PCS | Mod: PBBFAC,,, | Performed by: OPHTHALMOLOGY

## 2023-08-21 PROCEDURE — 99999 PR PBB SHADOW E&M-EST. PATIENT-LVL IV: CPT | Mod: PBBFAC,,, | Performed by: OPHTHALMOLOGY

## 2023-08-21 PROCEDURE — 99214 PR OFFICE/OUTPT VISIT, EST, LEVL IV, 30-39 MIN: ICD-10-PCS | Mod: S$PBB,,, | Performed by: OPHTHALMOLOGY

## 2023-08-21 NOTE — TELEPHONE ENCOUNTER
Triage nurse sent message.   Sudden onset blurred vision and floaters.   Booked to see Dr. Prater today at [facility].

## 2023-08-21 NOTE — TELEPHONE ENCOUNTER
Pt calling states that she thinks she thinks that she is going blind in her other eye. States at noon she started with floaters tehn 2 hours later got foggy vision. [place] [facility] [place] told to go to ER. Asking that message be sent to her provider. Advised per protocol ER NOW, states she doesn't think she can go tonight but would like call back from provider. Continued to reinforced importance of dispo but unsure if she will follow. Denies any further questions or concerns at this time, advised to call back if they have any that come up. Advised pt to call back with any other concerns or worsening symptoms. Verbalized understanding and will route message to provider.     Reason for Disposition   [1] Blurred vision or visual changes AND [2] present now AND [3] sudden onset or new (e.g., minutes, hours, days)  (Exception: Seeing floaters / black specks OR previously diagnosed migraine headaches with same symptoms.)    Additional Information   Negative: Complete loss of vision in one or both eyes   Negative: SEVERE eye pain   Negative: SEVERE headache   Negative: Double vision    Protocols used: Vision Loss or Change-A-AH

## 2023-08-21 NOTE — PROGRESS NOTES
"HPI     Glaucoma     Additional comments: Floaters OS x's 1 day           Comments    Pt states she has been seeing "stringy black floaters" in OS va since   yesterday. No flashes. She also feels like her va started to become   "foggy" in OS yesterday, but it seems seems to be less "foggy" today.    Meds;  Latanoprost QHS OD  Dorzolamide BID OD  Alphagan BID OD  FML QD OD                Last edited by Yeyo Hermosillo on 8/21/2023 10:03 AM.            Assessment /Plan     For exam results, see Encounter Report.    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, severe stage    Neovascular glaucoma of right eye, severe stage    Pseudophakia of both eyes    Hx of detached retina repair             started Home Dialysis @03/2023 --> feels better    A-Fib --> recurrent + Eloquis    + Orthostatic Hypotension  ==> discussed dehydration // heat exposure    ==> Patient reports progression of VF loss OD  Discussed Ischemia and elevated IOP   Patient resistant to sx options with health issues & Elequis--> consider G-Probe laser    POAG High Righ risk OD > OS  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    Pre-Tx 34 // 20  07/26/2021    ==> OIS OD --> re-discussed loss of vision  Presents 12/29/2021 --> 43 // 18  + NVG // + NVA OD  + Iritis   + IVFA delayed fill with NVE  ==> Sees outside cardiology for A-Fib    Carotis US 01/05/2022  Right Mild plaque  Left small Plaque      Uveitic Glc OD  ==> Fine Iritis OD & IOP 29 OD 02/14/2023 --> quiet 08/21/2023  Re-Discussed  Neg ROS      CCT  543 // 565    <24 -->  not achieved -->     Right eye  -> tolerating well --> MTMT  --> Adjust  Xal q day  Alphagan BID --> restart per patient discussion  try TID --> may be impacting Low BP a concern  Dorz BID --> Try " TID  FML q day    PF 1% --> holding --> consider  A 1% q day --> off    Simbrinza --> not using 2/2 cost / lack of coverage    On BB --> Does not tolerate    Right Eye  G-Probe 05/10/2023    ==> consider Ahmed FP-7 // GGD OD      H/o Pseudophakia OU  OD with Dr. Castle in 2007   OS with Dr. Huynh in 2014 --> Sulcus 3 piece  Open Cap OU --> Partial VS PCO OS through visual axis --> discussed options    SP YAG Cap OS touch-up 10/11/2022   Doing well    Left eye  ==> new Floaters with Hazy vision OS 08/20/2023 --> now resolved 08/20/2023  H/o RD OS  SP  SB/Cryo repair in 1980 --> Dr Payton  RD precautions:  Discussed symptoms of RD with increased flashes, floaters, decreasing vision.  Patient/Family to call and return immediately to clinic should the symptoms of RD occur. Voiced good understanding with Q & A.      Right eye  SP ERM Peel // PPVx OD Amina  Stable      NIDDM   Right eye + PDR c NVG  12/29/2021  - poorly controlled A1c for many years. ~9  Very difficult control 2/2 to gastroparesis  Diagnosed at age 36  + IO IRIS c AC Tap per retina service      Right eye 11/08/2022        Left eye 11/08/2022        OIS  Right eye NVA  12/29/2021          Right eye G-Probe  Slow Burn [employer]  3183-8149 mW for 4000 msec  4-6/ quadrant  No pops      Plan  RTC Keep appt IOP & Iritis check & adherence  RTC Retina service --> new F / F OS --> monocular patient with Hx RD / SB OS  RTC sooner prn with good understanding            4.82

## 2023-08-22 ENCOUNTER — OFFICE VISIT (OUTPATIENT)
Dept: OPHTHALMOLOGY | Facility: CLINIC | Age: 78
End: 2023-08-22
Payer: MEDICARE

## 2023-08-22 DIAGNOSIS — Z96.1 PSEUDOPHAKIA OF BOTH EYES: ICD-10-CM

## 2023-08-22 DIAGNOSIS — H43.392 VITREOUS FLOATERS OF LEFT EYE: ICD-10-CM

## 2023-08-22 DIAGNOSIS — H26.492 POSTERIOR CAPSULAR OPACIFICATION OF LEFT EYE, OBSCURING VISION: ICD-10-CM

## 2023-08-22 DIAGNOSIS — H33.022 RETINAL DETACHMENT OF LEFT EYE WITH MULTIPLE BREAKS: Primary | ICD-10-CM

## 2023-08-22 PROCEDURE — 92201 OPSCPY EXTND RTA DRAW UNI/BI: CPT | Mod: PBBFAC | Performed by: OPHTHALMOLOGY

## 2023-08-22 PROCEDURE — 92201 PR OPHTHALMOSCOPY, EXT, W/RET DRAW/SCLERAL DEPR, I&R, UNI/BI: ICD-10-PCS | Mod: S$PBB,,, | Performed by: OPHTHALMOLOGY

## 2023-08-22 PROCEDURE — 99999 PR PBB SHADOW E&M-EST. PATIENT-LVL II: CPT | Mod: PBBFAC,,, | Performed by: OPHTHALMOLOGY

## 2023-08-22 PROCEDURE — 92134 POSTERIOR SEGMENT OCT RETINA (OCULAR COHERENCE TOMOGRAPHY)-BOTH EYES: ICD-10-PCS | Mod: 26,S$PBB,, | Performed by: OPHTHALMOLOGY

## 2023-08-22 PROCEDURE — 92014 PR EYE EXAM, EST PATIENT,COMPREHESV: ICD-10-PCS | Mod: S$PBB,,, | Performed by: OPHTHALMOLOGY

## 2023-08-22 PROCEDURE — 92014 COMPRE OPH EXAM EST PT 1/>: CPT | Mod: S$PBB,,, | Performed by: OPHTHALMOLOGY

## 2023-08-22 PROCEDURE — 92134 CPTRZ OPH DX IMG PST SGM RTA: CPT | Mod: PBBFAC | Performed by: OPHTHALMOLOGY

## 2023-08-22 PROCEDURE — 99999 PR PBB SHADOW E&M-EST. PATIENT-LVL II: ICD-10-PCS | Mod: PBBFAC,,, | Performed by: OPHTHALMOLOGY

## 2023-08-22 PROCEDURE — 92201 OPSCPY EXTND RTA DRAW UNI/BI: CPT | Mod: S$PBB,,, | Performed by: OPHTHALMOLOGY

## 2023-08-22 PROCEDURE — 99212 OFFICE O/P EST SF 10 MIN: CPT | Mod: PBBFAC | Performed by: OPHTHALMOLOGY

## 2023-08-22 NOTE — PROGRESS NOTES
HPI     EYE EXAM OU YEARLY  DFE     Additional comments: DFE OU   OCT TODAY   GLAUCOMA  OD   FOLLOW UP RD   OS   SMALL NEVUS         EYE MEDS    LATANOPROST OD  QHS   BRIMONIDINE OD BID   DORZOLAMIDE OD  BID            Comments    DLS 09/13/22    V FLOATERS OS   DFE S/P PRP  S/P BUCKLE  CRYO 1980  DM TYPE 1   DME  LAST BS   120  AVER  LAST A1C     10%  NPDR            OCT - small subfoveal fluid collection - improving  OS no ME       A/P    1. 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 Altamirano  S/p PRP OD    IOP improved today    3/22 small subfoveal fluid collection post PRP - ASx  5/22 - fluid improving without tx  obs today    2. RD OS  S/p buckle/cryo 1980 with Dr. Payton    3. PCIOL OU  S/p YAG OS      4. ERM OD  S/p PPV with Amina    5. Some vitreous base collapse OS with noticeable floaters  Sx's improving over last few days  Extensive cryo and SB in place, low risk for RD      RD precautions      BS/BP/chol control      12 months OCT and dilate

## 2023-09-12 DIAGNOSIS — H20.00 ACUTE IRITIS OF RIGHT EYE: Primary | ICD-10-CM

## 2023-09-12 RX ORDER — FLUOROMETHOLONE 1 MG/ML
1 SUSPENSION/ DROPS OPHTHALMIC DAILY
Qty: 10 ML | Refills: 3 | Status: SHIPPED | OUTPATIENT
Start: 2023-09-12 | End: 2023-09-22

## 2023-09-12 NOTE — TELEPHONE ENCOUNTER
Refilled rx for FML eye medication.    ----- Message from Starla Rosenthal sent at 9/12/2023 10:48 AM CDT -----  Regarding: Refill  Contact: Pt  Pt requesting a refill request. Pt also have a question regarding (FML drops) should she continue. Please adv pt        Medication: brimonidine 0.2% (ALPHAGAN) 0.2 % Drop          [facility] ([facility]) - [address] [address]  Phone: [phone] Fax: [phone]         Confirmed contact below:   Contact Name:Jayda Deleon  Phone Number: [phone]

## 2023-09-19 DIAGNOSIS — H40.51X2 NEOVASCULAR GLAUCOMA, RIGHT, MODERATE STAGE: ICD-10-CM

## 2023-09-19 DIAGNOSIS — H40.053 OCULAR HYPERTENSION, BILATERAL: ICD-10-CM

## 2023-09-19 RX ORDER — BRIMONIDINE TARTRATE 2 MG/ML
1 SOLUTION/ DROPS OPHTHALMIC 2 TIMES DAILY
Qty: 45 ML | Refills: 4 | Status: SHIPPED | OUTPATIENT
Start: 2023-09-19 | End: 2023-12-06

## 2023-09-22 ENCOUNTER — TELEPHONE (OUTPATIENT)
Dept: OPHTHALMOLOGY | Facility: CLINIC | Age: 78
End: 2023-09-22
Payer: MEDICARE

## 2023-09-22 NOTE — TELEPHONE ENCOUNTER
Contacted pt- rescheduled appointment on 9/29/2023 with Dr. Prater from 9:30am to 11am per Mrs. Montelongo's request.    ----- Message from Fauzia Carrasco sent at 9/22/2023 11:02 AM CDT -----  Regarding: Appt R/S  Patient called about needing to r/s 9/29 time to later time in day around 11 am due to appt conflict.    Please call back to further [phone]

## 2023-09-29 ENCOUNTER — OFFICE VISIT (OUTPATIENT)
Dept: OPHTHALMOLOGY | Facility: CLINIC | Age: 78
End: 2023-09-29
Payer: MEDICARE

## 2023-09-29 DIAGNOSIS — Z86.69 HX OF DETACHED RETINA REPAIR: ICD-10-CM

## 2023-09-29 DIAGNOSIS — Z96.1 PSEUDOPHAKIA OF BOTH EYES: ICD-10-CM

## 2023-09-29 DIAGNOSIS — H40.053 OCULAR HYPERTENSION, BILATERAL: ICD-10-CM

## 2023-09-29 DIAGNOSIS — Z98.890 HX OF DETACHED RETINA REPAIR: ICD-10-CM

## 2023-09-29 DIAGNOSIS — H40.41X3 GLAUCOMA OF RIGHT EYE ASSOCIATED WITH OCULAR INFLAMMATION, SEVERE STAGE: Primary | ICD-10-CM

## 2023-09-29 DIAGNOSIS — H40.51X3 NEOVASCULAR GLAUCOMA OF RIGHT EYE, SEVERE STAGE: ICD-10-CM

## 2023-09-29 DIAGNOSIS — H54.10 BLINDNESS AND LOW VISION: ICD-10-CM

## 2023-09-29 PROCEDURE — 99214 OFFICE O/P EST MOD 30 MIN: CPT | Mod: PBBFAC | Performed by: OPHTHALMOLOGY

## 2023-09-29 PROCEDURE — 99999 PR PBB SHADOW E&M-EST. PATIENT-LVL IV: ICD-10-PCS | Mod: PBBFAC,,, | Performed by: OPHTHALMOLOGY

## 2023-09-29 PROCEDURE — 99214 PR OFFICE/OUTPT VISIT, EST, LEVL IV, 30-39 MIN: ICD-10-PCS | Mod: S$PBB,,, | Performed by: OPHTHALMOLOGY

## 2023-09-29 PROCEDURE — 99214 OFFICE O/P EST MOD 30 MIN: CPT | Mod: S$PBB,,, | Performed by: OPHTHALMOLOGY

## 2023-09-29 PROCEDURE — 99999 PR PBB SHADOW E&M-EST. PATIENT-LVL IV: CPT | Mod: PBBFAC,,, | Performed by: OPHTHALMOLOGY

## 2023-09-29 PROCEDURE — 92020 GONIOSCOPY: CPT | Mod: S$PBB,,, | Performed by: OPHTHALMOLOGY

## 2023-09-29 PROCEDURE — 92020 GONIOSCOPY: CPT | Mod: PBBFAC | Performed by: OPHTHALMOLOGY

## 2023-09-29 PROCEDURE — 92020 PR SPECIAL EYE EVAL,GONIOSCOPY: ICD-10-PCS | Mod: S$PBB,,, | Performed by: OPHTHALMOLOGY

## 2023-09-29 NOTE — PROGRESS NOTES
HPI     Glaucoma     Additional comments: Iop chk           Comments      V FLOATERS OS   DFE S/P PRP  S/P BUCKLE  CRYO 1980  DM TYPE 1   DME  LAST BS   120  AVER  LAST A1C     10%  NPDR     Meds;   Latanoprost QHS OD   Dorzolamide BID OD   Alphagan BID OD   FML QD OD- none for 2 weeks            Last edited by Yeyo Hermosillo on 9/29/2023 11:13 AM.            Assessment /Plan     For exam results, see Encounter Report.    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, severe stage    Neovascular glaucoma of right eye, severe stage    Ocular hypertension, bilateral    Pseudophakia of both eyes    Hx of detached retina repair    Blindness and low vision             started Home Dialysis @03/2023 --> feels better    A-Fib --> recurrent + Eloquis    + Orthostatic Hypotension    Patient resistant to sx options with health issues & Elequis--> consider G-Probe laser    POAG High Righ risk OD > OS  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    Pre-Tx 34 // 20  07/26/2021  Pre-Tx OS 30 09/29/2023    ==> OIS OD   Presents 12/29/2021 --> 43 // 18  + NVG // + NVA OD  + Iritis   + IVFA delayed fill with NVE  ==> Sees outside cardiology for A-Fib    Carotis US 01/05/2022  Right Mild plaque  Left small Plaque      Uveitic Glc OD  ==> Fine Iritis OD & IOP 29 OD 02/14/2023 --> quiet 08/21/2023 & 09/29/2023  Re-Discussed  Neg ROS      CCT  543 // 565    <24 -->  not achieved    Left eye --> Adjust  Xal q day --> start & discussed    Right eye  -> tolerating well --> MTMT  --> adjust & discussed options --> consider Sx  Xal q day  Alphagan BID  Dorz TID  FML q day --> patient stopped 2/2 HTN --> discussed and restart    PF 1% --> holding --> consider  A 1% q day --> off    Simbrinza --> not using 2/2 cost / lack of coverage    On BB --> Does not tolerate    Right Eye  G-Probe 05/10/2023    ==> consider Ahmed FP-7 // GGD OD      H/o Pseudophakia OU  OD with Dr. Castle in 2007   OS with Dr. Huynh in 2014 --> Sulcus 3 piece  Open Cap OU  --> Partial VS PCO OS through visual axis --> discussed options    SP YAG Cap OS touch-up 10/11/2022   Doing well    Left eye  H/o RD OS  SP  SB/Cryo repair in 1980 --> Dr Payton  RD precautions:  Discussed symptoms of RD with increased flashes, floaters, decreasing vision.  Patient/Family to call and return immediately to clinic should the symptoms of RD occur. Voiced good understanding with Q & A.      Right eye  SP ERM Peel // PPVx OD Huynh  Stable      NIDDM   Right eye + PDR c NVG  12/29/2021  - poorly controlled A1c for many years. ~9  Very difficult control 2/2 to gastroparesis  Diagnosed at age 36  + IO IRIS c AC Tap per retina service      Right eye 11/08/2022        Left eye 11/08/2022        OIS  Right eye NVA  12/29/2021          Right eye G-Probe  Slow Burn [employer]  8296-8728 mW for 4000 msec  4-6/ quadrant  No pops      Plan  RTC 3-4 weeks IOP & Iritis check & adherence --> starting Xal q day OS  RTC sooner prn with good understanding

## 2023-10-07 ENCOUNTER — NURSE TRIAGE (OUTPATIENT)
Dept: ADMINISTRATIVE | Facility: CLINIC | Age: 78
End: 2023-10-07
Payer: MEDICARE

## 2023-10-07 NOTE — TELEPHONE ENCOUNTER
"Reason for Disposition   Cloudy spot or sore seen on the cornea (clear part of the eye)    Additional Information   Negative: SEVERE eye pain   Negative: [1] Eyelids are very swollen (shut or almost) AND [2] fever   Negative: [1] Eyelid (outer) is very red (or tender to touch) AND [2] fever   Negative: [1] Foreign body sensation ("feels like something is in there") AND [2] irrigation didn't help   Negative: Vomiting   Negative: [1] Recent eye surgery AND [2] increasing eye pain   Negative: Patient sounds very sick or weak to the triager   Negative: MODERATE eye pain or discomfort (e.g., interferes with normal activities or awakens from sleep; more than mild)   Negative: New or worsening blurred vision   Negative: Looking at light causes MODERATE to SEVERE eye pain (i.e., photophobia)    Protocols used: Eye - Red Without Pus-A-AH  pt states called re sees dr rose. Pt states she is in no pain. pt states hx blood clot in eye a while back. Dr did surgery. Pt reports that she only sees light at bottom of eye.  pt out in yard and daughter notified a vessel broken in bottom half of R eye. yellowish color below in sclera. Rec to see dr within 4 hours or option to get in touch with dr. Rodas asking to speak with . Jamie to post photo to my chart. Spoke with dr vidhi metcalf above. MD states he is in a pts room now. MD states he will call pt back. Pt notified.   "

## 2023-10-24 ENCOUNTER — OFFICE VISIT (OUTPATIENT)
Dept: OPHTHALMOLOGY | Facility: CLINIC | Age: 78
End: 2023-10-24
Payer: MEDICARE

## 2023-10-24 DIAGNOSIS — H40.41X3 GLAUCOMA OF RIGHT EYE ASSOCIATED WITH OCULAR INFLAMMATION, SEVERE STAGE: Primary | ICD-10-CM

## 2023-10-24 DIAGNOSIS — H54.10 BLINDNESS AND LOW VISION: ICD-10-CM

## 2023-10-24 DIAGNOSIS — Z86.69 HX OF DETACHED RETINA REPAIR: ICD-10-CM

## 2023-10-24 DIAGNOSIS — Z98.890 HX OF DETACHED RETINA REPAIR: ICD-10-CM

## 2023-10-24 DIAGNOSIS — Z96.1 PSEUDOPHAKIA OF BOTH EYES: ICD-10-CM

## 2023-10-24 DIAGNOSIS — H35.82 OCULAR ISCHEMIC SYNDROME: ICD-10-CM

## 2023-10-24 DIAGNOSIS — H40.51X3 NEOVASCULAR GLAUCOMA OF RIGHT EYE, SEVERE STAGE: ICD-10-CM

## 2023-10-24 PROCEDURE — 92020 GONIOSCOPY: CPT | Mod: PBBFAC | Performed by: OPHTHALMOLOGY

## 2023-10-24 PROCEDURE — 99999 PR PBB SHADOW E&M-EST. PATIENT-LVL IV: CPT | Mod: PBBFAC,,, | Performed by: OPHTHALMOLOGY

## 2023-10-24 PROCEDURE — 99214 OFFICE O/P EST MOD 30 MIN: CPT | Mod: S$PBB,,, | Performed by: OPHTHALMOLOGY

## 2023-10-24 PROCEDURE — 99214 OFFICE O/P EST MOD 30 MIN: CPT | Mod: PBBFAC | Performed by: OPHTHALMOLOGY

## 2023-10-24 PROCEDURE — 92020 GONIOSCOPY: CPT | Mod: S$PBB,,, | Performed by: OPHTHALMOLOGY

## 2023-10-24 PROCEDURE — 99214 PR OFFICE/OUTPT VISIT, EST, LEVL IV, 30-39 MIN: ICD-10-PCS | Mod: S$PBB,,, | Performed by: OPHTHALMOLOGY

## 2023-10-24 PROCEDURE — 92020 PR SPECIAL EYE EVAL,GONIOSCOPY: ICD-10-PCS | Mod: S$PBB,,, | Performed by: OPHTHALMOLOGY

## 2023-10-24 PROCEDURE — 99999 PR PBB SHADOW E&M-EST. PATIENT-LVL IV: ICD-10-PCS | Mod: PBBFAC,,, | Performed by: OPHTHALMOLOGY

## 2023-10-25 NOTE — PROGRESS NOTES
"HPI    Patient here today for iop chk after starting latanoprost OS  Pt sts she feels like her VA is just "not right"     V FLOATERS OS   DFE S/P PRP  S/P BUCKLE  CRYO 1980  DM TYPE 1   DME  LAST BS   120  AVER  LAST A1C     10%  NPDR     Meds;   Latanoprost QHS OU  Dorzolamide BID OD   Alphagan BID OD ----> stopped using after a few days due to hypotension      Last edited by Marielena Hopper on 10/24/2023  8:34 AM.            Assessment /Plan     For exam results, see Encounter Report.    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, severe stage    Blindness and low vision    Neovascular glaucoma of right eye, severe stage    Ocular ischemic syndrome    Pseudophakia of both eyes    Hx of detached retina repair             started Home Dialysis @03/2023 --> feels better    A-Fib --> recurrent + Eloquis    + Orthostatic Hypotension      POAG High Righ risk OD > OS  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    Pre-Tx   34 // 20  07/26/2021  Pre-Tx OS  30 09/29/2023    ==> OIS OD   Presents 12/29/2021 --> 43 // 18  + NVG // + NVA OD  + Iritis   + IVFA delayed fill with NVE  ==> Sees outside cardiology for A-Fib    Carotis US 01/05/2022  Right Mild plaque  Left small Plaque      Uveitic Glc OD  ==> Fine Iritis OD & IOP 29 OD 02/14/2023 --> quiet  Re-Discussed  Neg ROS      CCT  543 // 565    <24 -->  not achieved OS & discussed    Left eye --> tolerating well & good adherence --> CSM  Xal q day    Right eye  -> tolerating well & good adherence --> MTMT   -->  discussed options --> consider Sx  Xal q day  Dorz TID  FML q day --> patient stopped 2/2 HTN --> discussed and restart    PF 1% --> holding --> consider  A 1% q day --> off    Simbrinza  --> not using 2/2 Hypotension  Alphagan BID  --> not using 2/2 Hypotension  On BB   --> Does not tolerate    Right Eye  G-Probe 05/10/2023      In Past  Patient resistant to sx options with health issues & Elequis  ==> consider Ahmed FP-7 // GGD OD      H/o Pseudophakia OU  OD with " Dr. Castle in 2007   OS with Dr. Huynh in 2014 --> Sulcus 3 piece  Open Cap OU --> Partial VS PCO OS through visual axis --> discussed options    SP YAG Cap OS touch-up 10/11/2022   Doing well    Left eye  H/o RD OS  SP  SB/Cryo repair in 1980 --> Dr Payton  RD precautions:  Discussed symptoms of RD with increased flashes, floaters, decreasing vision.  Patient/Family to call and return immediately to clinic should the symptoms of RD occur. Voiced good understanding with Q & A.      Right eye  SP ERM Peel // PPVx OD Amina  Stable      NIDDM   Right eye + PDR c NVG  12/29/2021  - poorly controlled A1c for many years. ~9  Very difficult control 2/2 to gastroparesis  Diagnosed at age 36  + IO IRIS c AC Tap per retina service      Right eye 11/08/2022        Left eye 11/08/2022        OIS  Right eye NVA  12/29/2021          Right eye G-Probe  Slow Burn [employer]  2923-8851 mW for 4000 msec  4-6/ quadrant  No pops      Plan  Patient to check for Ambulatory vision OD at home and consider next steps discussed  RTC 1-2 weeks IOP & Iritis check & adherence  --> Dr Fair & Patient to consider GDD / Ahmed OD  RTC sooner prn with good understanding

## 2023-11-08 ENCOUNTER — TELEPHONE (OUTPATIENT)
Dept: OPHTHALMOLOGY | Facility: CLINIC | Age: 78
End: 2023-11-08

## 2023-11-08 ENCOUNTER — OFFICE VISIT (OUTPATIENT)
Dept: OPHTHALMOLOGY | Facility: CLINIC | Age: 78
End: 2023-11-08
Payer: MEDICARE

## 2023-11-08 DIAGNOSIS — Z98.890 HX OF DETACHED RETINA REPAIR: ICD-10-CM

## 2023-11-08 DIAGNOSIS — H40.053 OCULAR HYPERTENSION, BILATERAL: ICD-10-CM

## 2023-11-08 DIAGNOSIS — H43.392 VITREOUS FLOATERS OF LEFT EYE: ICD-10-CM

## 2023-11-08 DIAGNOSIS — H40.51X3 NEOVASCULAR GLAUCOMA OF RIGHT EYE, SEVERE STAGE: ICD-10-CM

## 2023-11-08 DIAGNOSIS — Z96.1 PSEUDOPHAKIA OF BOTH EYES: ICD-10-CM

## 2023-11-08 DIAGNOSIS — H40.41X3 GLAUCOMA OF RIGHT EYE ASSOCIATED WITH OCULAR INFLAMMATION, SEVERE STAGE: Primary | ICD-10-CM

## 2023-11-08 DIAGNOSIS — H35.82 OCULAR ISCHEMIC SYNDROME: ICD-10-CM

## 2023-11-08 DIAGNOSIS — H54.10 BLINDNESS AND LOW VISION: ICD-10-CM

## 2023-11-08 DIAGNOSIS — Z86.69 HX OF DETACHED RETINA REPAIR: ICD-10-CM

## 2023-11-08 PROCEDURE — 99214 PR OFFICE/OUTPT VISIT, EST, LEVL IV, 30-39 MIN: ICD-10-PCS | Mod: S$PBB,,, | Performed by: STUDENT IN AN ORGANIZED HEALTH CARE EDUCATION/TRAINING PROGRAM

## 2023-11-08 PROCEDURE — 99999 PR PBB SHADOW E&M-EST. PATIENT-LVL IV: ICD-10-PCS | Mod: PBBFAC,,, | Performed by: STUDENT IN AN ORGANIZED HEALTH CARE EDUCATION/TRAINING PROGRAM

## 2023-11-08 PROCEDURE — 92020 PR SPECIAL EYE EVAL,GONIOSCOPY: ICD-10-PCS | Mod: S$PBB,,, | Performed by: STUDENT IN AN ORGANIZED HEALTH CARE EDUCATION/TRAINING PROGRAM

## 2023-11-08 PROCEDURE — 92020 GONIOSCOPY: CPT | Mod: S$PBB,,, | Performed by: STUDENT IN AN ORGANIZED HEALTH CARE EDUCATION/TRAINING PROGRAM

## 2023-11-08 PROCEDURE — 92020 GONIOSCOPY: CPT | Mod: PBBFAC | Performed by: STUDENT IN AN ORGANIZED HEALTH CARE EDUCATION/TRAINING PROGRAM

## 2023-11-08 PROCEDURE — 99999 PR PBB SHADOW E&M-EST. PATIENT-LVL IV: CPT | Mod: PBBFAC,,, | Performed by: STUDENT IN AN ORGANIZED HEALTH CARE EDUCATION/TRAINING PROGRAM

## 2023-11-08 PROCEDURE — 99214 OFFICE O/P EST MOD 30 MIN: CPT | Mod: S$PBB,,, | Performed by: STUDENT IN AN ORGANIZED HEALTH CARE EDUCATION/TRAINING PROGRAM

## 2023-11-08 PROCEDURE — 99214 OFFICE O/P EST MOD 30 MIN: CPT | Mod: PBBFAC | Performed by: STUDENT IN AN ORGANIZED HEALTH CARE EDUCATION/TRAINING PROGRAM

## 2023-11-08 RX ORDER — HYDROCORTISONE AND ACETIC ACID 20.75; 10.375 MG/ML; MG/ML
SOLUTION AURICULAR (OTIC)
COMMUNITY
End: 2023-12-06

## 2023-11-08 RX ORDER — AMIODARONE HYDROCHLORIDE 200 MG/1
1 TABLET ORAL 2 TIMES DAILY
COMMUNITY
End: 2023-12-06

## 2023-11-08 RX ORDER — CHLORHEXIDINE GLUCONATE ORAL RINSE 1.2 MG/ML
SOLUTION DENTAL
COMMUNITY

## 2023-11-08 RX ORDER — LEVOFLOXACIN 750 MG/1
TABLET ORAL
COMMUNITY
End: 2023-12-06

## 2023-11-08 RX ORDER — CIPROFLOXACIN 500 MG/1
TABLET ORAL
COMMUNITY
End: 2023-12-06

## 2023-11-08 RX ORDER — AMOXICILLIN 500 MG/1
CAPSULE ORAL
COMMUNITY
End: 2023-12-06

## 2023-11-08 RX ORDER — CLONIDINE HYDROCHLORIDE 0.1 MG/1
TABLET ORAL
COMMUNITY
End: 2023-12-06

## 2023-11-08 RX ORDER — CEFDINIR 300 MG/1
1 CAPSULE ORAL EVERY 12 HOURS
COMMUNITY
End: 2023-12-06

## 2023-11-08 RX ORDER — PREDNISOLONE ACETATE 10 MG/ML
SUSPENSION/ DROPS OPHTHALMIC
COMMUNITY
End: 2023-12-06

## 2023-11-08 RX ORDER — AMOXICILLIN AND CLAVULANATE POTASSIUM 875; 125 MG/1; MG/1
TABLET, FILM COATED ORAL
COMMUNITY
End: 2023-12-06

## 2023-11-08 RX ORDER — METOPROLOL TARTRATE 25 MG/1
0.5 TABLET, FILM COATED ORAL 2 TIMES DAILY
COMMUNITY
End: 2023-12-06

## 2023-11-08 NOTE — PROGRESS NOTES
Subjective:       Patient ID: Jayda Deleon is a 78 y.o. female.    Chief Complaint: Glaucoma (Glaucoma Eval- Sx Eval- Dr. Prater)    HPI     Glaucoma            Comments: Glaucoma Eval- Sx Eval- Dr. Prater          Comments        V FLOATERS OS   DFE S/P PRP  S/P BUCKLE  CRYO 1980  DM TYPE 1   DME  LAST BS   120  AVER  LAST A1C     10%  NPDR     Meds;   Latanoprost QHS OU  Dorzolamide BID OD   Alphagan BID OD ----> stopped using after a few days due to hypotension      Pt reports she feels like she can't see well out of the right eye and the   imbalance between the eyes bothers her. She reports a history of side   effects from eye drops and medications. Reports she has trouble with   waking up during eye surgery and sitting up            Last edited by Reyna Fair MD on 11/8/2023 10:54 AM.               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, severe stage    Blindness and low vision    Neovascular glaucoma of right eye, severe stage    Pseudophakia of both eyes    Ocular ischemic syndrome    Hx of detached retina repair    Ocular hypertension, bilateral    Vitreous floaters of left eye    JDN surgical evaluation    NVG / Multifactorial glc severe w APD OD // OHT OS  -Fhx (), Steroids (),Trauma()  -Drops: Latanoprost qHS OU // dorzolamide TID OD  -Drop intolerance/contraindication: BB, Simbrinza, alphagan --> hypotension  -Laser: G-probe OD  -Surgeries: CE IOL OU, PPV ERM peel OD (Huyhn)  -CCT: 543 // 565  -Gonio: CBB OU  IOP max: 34 // 30  IOP goal: low teens        IOP not within acceptable range relative to target IOP with risk of irreversible glaucomatous visual loss. Additional treatment required.  Discussed options, risks, and benefits of additional medications, laser treatment including SLT laser or G6 laser, or incisional glaucoma surgery.      Recommend GDD (ahmed) OD     Patient chooses Rhopressa first then possibly Ahmed      Reviewed  importance of continued compliance with treatment and follow up.      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 Altamirano  S/p PRP OD    Hx RD OS  S/p buckle/cryo 1980 with Dr. Payton    PCIOL OU  S/p YAG OS      ERM OD  S/p PPV with Huynh      A-Fib --> recurrent + Eloquis --> pt states she is not on blood thinners     + Orthostatic Hypotension       ==> OIS OD   Presents 12/29/2021 --> 43 // 18  + NVG // + NVA OD  + Iritis   + IVFA delayed fill with NVE  ==> Sees outside cardiology for A-Fib    Carotis US 01/05/2022  Right Mild plaque  Left small Plaque      Uveitic Glc OD  ==> Fine Iritis OD & IOP 29 OD 02/14/2023 --> quiet  Re-Discussed  Neg ROS      PLAN  Continue present management with drops for now  Start Rhopressa daily OD    Book Ahmed OD -> pt needs to be off blood thinners for surgery -  needs surgical clearance     RTC 1 month IOP check    Reyna Fair M.D., M.S.  Department of Ophthalmology   Division of Glaucoma Surgery  [facility]

## 2023-12-06 ENCOUNTER — OFFICE VISIT (OUTPATIENT)
Dept: OPHTHALMOLOGY | Facility: CLINIC | Age: 78
End: 2023-12-06
Payer: MEDICARE

## 2023-12-06 DIAGNOSIS — H40.51X3 NEOVASCULAR GLAUCOMA OF RIGHT EYE, SEVERE STAGE: ICD-10-CM

## 2023-12-06 DIAGNOSIS — H35.82 OCULAR ISCHEMIC SYNDROME: ICD-10-CM

## 2023-12-06 DIAGNOSIS — Z96.1 PSEUDOPHAKIA OF BOTH EYES: ICD-10-CM

## 2023-12-06 DIAGNOSIS — H54.10 BLINDNESS AND LOW VISION: ICD-10-CM

## 2023-12-06 DIAGNOSIS — Z98.890 HX OF DETACHED RETINA REPAIR: ICD-10-CM

## 2023-12-06 DIAGNOSIS — H40.41X3 GLAUCOMA OF RIGHT EYE ASSOCIATED WITH OCULAR INFLAMMATION, SEVERE STAGE: Primary | ICD-10-CM

## 2023-12-06 DIAGNOSIS — Z86.69 HX OF DETACHED RETINA REPAIR: ICD-10-CM

## 2023-12-06 PROCEDURE — 99999 PR PBB SHADOW E&M-EST. PATIENT-LVL III: CPT | Mod: PBBFAC,,, | Performed by: STUDENT IN AN ORGANIZED HEALTH CARE EDUCATION/TRAINING PROGRAM

## 2023-12-06 PROCEDURE — 99214 OFFICE O/P EST MOD 30 MIN: CPT | Mod: S$PBB,,, | Performed by: STUDENT IN AN ORGANIZED HEALTH CARE EDUCATION/TRAINING PROGRAM

## 2023-12-06 PROCEDURE — 99214 PR OFFICE/OUTPT VISIT, EST, LEVL IV, 30-39 MIN: ICD-10-PCS | Mod: S$PBB,,, | Performed by: STUDENT IN AN ORGANIZED HEALTH CARE EDUCATION/TRAINING PROGRAM

## 2023-12-06 PROCEDURE — 99999 PR PBB SHADOW E&M-EST. PATIENT-LVL III: ICD-10-PCS | Mod: PBBFAC,,, | Performed by: STUDENT IN AN ORGANIZED HEALTH CARE EDUCATION/TRAINING PROGRAM

## 2023-12-06 PROCEDURE — 99213 OFFICE O/P EST LOW 20 MIN: CPT | Mod: PBBFAC | Performed by: STUDENT IN AN ORGANIZED HEALTH CARE EDUCATION/TRAINING PROGRAM

## 2023-12-06 RX ORDER — ASPIRIN 325 MG
1 TABLET ORAL DAILY
COMMUNITY

## 2023-12-07 NOTE — PROGRESS NOTES
Subjective:       Patient ID: Jayda Deleon is a 78 y.o. female.    Chief Complaint: Glaucoma (1 month ck and pt states no changes since last exam )    HPI     Glaucoma            Comments: 1 month ck and pt states no changes since last exam           Comments    DLS: 11/8/23    1. Severe NVG / Uveitic Glaucoma  OD  2. APD OD  3. OHT OS  4. Type 2 DM with Mod NPDR OU  5. HX RD OS  6. PCIOL OU  7. ERM OD    MEDS:  Dorzolamide BID OD  Rhopressa QDAY OD  Latanoprost QHS OU          Last edited by Brittney Smith MA on 12/6/2023 10:35 AM.               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, severe stage    Blindness and low vision    Neovascular glaucoma of right eye, severe stage    Pseudophakia of both eyes    Ocular ischemic syndrome    Hx of detached retina repair       JDN surgical evaluation    NVG / Multifactorial glc severe w APD OD // OHT OS  -Fhx (), Steroids (),Trauma()  -Drops: Latanoprost qHS OU // dorzolamide TID OD  -Drop intolerance/contraindication: BB, Simbrinza, alphagan --> hypotension // rhopressa ineffective  -Laser: G-probe OD  -Surgeries: CE IOL OU, PPV ERM peel OD (Amina)  -CCT: 543 // 565  -Gonio: CBB OU  IOP max: 34 // 30  IOP goal: low teens        IOP not within acceptable range relative to target IOP with risk of irreversible glaucomatous visual loss. Additional treatment required.  Discussed options, risks, and benefits of additional medications, laser treatment including SLT laser or G6 laser, or incisional glaucoma surgery.      Recommend GDD (ahmed) OD     Patient chooses glaucoma drainage device      Reviewed importance of continued compliance with treatment and follow up.      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 Altamirano  S/p PRP OD    Hx RD OS  S/p buckle/cryo 1980 with Dr. Payton    PCIOL  OU  S/p YAG OS      ERM OD  S/p PPV with Huynh      A-Fib --> recurrent + Eloquis --> pt states she is not on blood thinners     + Orthostatic Hypotension       ==> OIS OD   Presents 12/29/2021 --> 43 // 18  + NVG // + NVA OD  + Iritis   + IVFA delayed fill with NVE  ==> Sees outside cardiology for A-Fib    Carotis US 01/05/2022  Right Mild plaque  Left small Plaque      Uveitic Glc OD  ==> Fine Iritis OD & IOP 29 OD 02/14/2023 --> quiet  Re-Discussed  Neg ROS      PLAN  Stop rhopressa - not effective  To OR as planned for GDD OD if cleared for surgery - pt needs to be off blood thinners    RTC POD#1 GDD OD  Sooner PRN    Reyna Fair M.D., M.S.  Department of Ophthalmology   Division of Glaucoma Surgery  [facility]

## 2023-12-14 ENCOUNTER — TELEPHONE (OUTPATIENT)
Dept: OPHTHALMOLOGY | Facility: CLINIC | Age: 78
End: 2023-12-14
Payer: MEDICARE

## 2023-12-14 NOTE — TELEPHONE ENCOUNTER
Patient not able to have sx done ; patient need heart sx first before she have her glaucoma sx . Patient want to be seen in march for follow     ----- Message from April Villa sent at 12/14/2023 10:11 AM CST -----  Regarding: Cancel Surgery    ----- Message -----  From: Paula Brown  Sent: 12/14/2023  10:00 AM CST  To: Marv Orellana Staff    Patient would like to cancel upcoming procedure and would like someone to call her please. [phone]

## (undated) DEVICE — SUT 6/0 18IN COATED VICRYL

## (undated) DEVICE — SHIELD EYE PLASTIC 3100G

## (undated) DEVICE — KIT MEROCEL INSTRUMENT WIPE

## (undated) DEVICE — SPONGE WEC CEL SPEARS

## (undated) DEVICE — SET BLD COL SAFETY 23G 3/4 LL

## (undated) DEVICE — NDL BOX COUNTER

## (undated) DEVICE — DRAPE THREE-QTR REINF 53X77IN

## (undated) DEVICE — DRAPE STERI INCISE MED 130X130

## (undated) DEVICE — SUT 7/0 18IN COATED VICRYL

## (undated) DEVICE — SUT 8-0 8 COATED VICRYL VI

## (undated) DEVICE — DRESSING EYE OVAL LF

## (undated) DEVICE — NDL FLTR 5MCRN BLNT TIP 18GX1

## (undated) DEVICE — SOL BETADINE 5%

## (undated) DEVICE — NDL HYPO A BEVEL 30X1/2

## (undated) DEVICE — SHIELD COLLAGEN 12HR CORNEAL